# Patient Record
Sex: MALE | Race: WHITE | NOT HISPANIC OR LATINO | URBAN - METROPOLITAN AREA
[De-identification: names, ages, dates, MRNs, and addresses within clinical notes are randomized per-mention and may not be internally consistent; named-entity substitution may affect disease eponyms.]

---

## 2018-03-25 ENCOUNTER — INPATIENT (INPATIENT)
Facility: HOSPITAL | Age: 83
LOS: 1 days | Discharge: HOME | End: 2018-03-27
Attending: INTERNAL MEDICINE

## 2018-03-25 LAB
BASOPHILS # BLD AUTO: 0.05 K/UL — SIGNIFICANT CHANGE UP (ref 0–0.2)
BASOPHILS NFR BLD AUTO: 0.6 % — SIGNIFICANT CHANGE UP (ref 0–1)
EOSINOPHIL # BLD AUTO: 0.21 K/UL — SIGNIFICANT CHANGE UP (ref 0–0.7)
EOSINOPHIL NFR BLD AUTO: 2.3 % — SIGNIFICANT CHANGE UP (ref 0–8)
HCT VFR BLD CALC: 31.1 % — LOW (ref 42–52)
HGB BLD-MCNC: 11.1 G/DL — LOW (ref 14–18)
IMM GRANULOCYTES NFR BLD AUTO: 0.2 % — SIGNIFICANT CHANGE UP (ref 0.1–0.3)
LYMPHOCYTES # BLD AUTO: 2.1 K/UL — SIGNIFICANT CHANGE UP (ref 1.2–3.4)
LYMPHOCYTES # BLD AUTO: 23.3 % — SIGNIFICANT CHANGE UP (ref 20.5–51.1)
MCHC RBC-ENTMCNC: 32 PG — HIGH (ref 27–31)
MCHC RBC-ENTMCNC: 35.7 G/DL — SIGNIFICANT CHANGE UP (ref 32–37)
MCV RBC AUTO: 89.6 FL — SIGNIFICANT CHANGE UP (ref 80–94)
MONOCYTES # BLD AUTO: 0.63 K/UL — HIGH (ref 0.1–0.6)
MONOCYTES NFR BLD AUTO: 7 % — SIGNIFICANT CHANGE UP (ref 1.7–9.3)
NEUTROPHILS # BLD AUTO: 6 K/UL — SIGNIFICANT CHANGE UP (ref 1.4–6.5)
NEUTROPHILS NFR BLD AUTO: 66.6 % — SIGNIFICANT CHANGE UP (ref 42.2–75.2)
PLATELET # BLD AUTO: 257 K/UL — SIGNIFICANT CHANGE UP (ref 130–400)
RBC # BLD: 3.47 M/UL — LOW (ref 4.7–6.1)
RBC # FLD: 13 % — SIGNIFICANT CHANGE UP (ref 11.5–14.5)
WBC # BLD: 9.01 K/UL — SIGNIFICANT CHANGE UP (ref 4.8–10.8)
WBC # FLD AUTO: 9.01 K/UL — SIGNIFICANT CHANGE UP (ref 4.8–10.8)

## 2018-03-25 RX ORDER — LABETALOL HCL 100 MG
10 TABLET ORAL ONCE
Qty: 0 | Refills: 0 | Status: COMPLETED | OUTPATIENT
Start: 2018-03-25 | End: 2018-03-25

## 2018-03-25 RX ADMIN — Medication 10 MILLIGRAM(S): at 23:29

## 2018-03-26 VITALS
DIASTOLIC BLOOD PRESSURE: 72 MMHG | HEART RATE: 70 BPM | RESPIRATION RATE: 18 BRPM | TEMPERATURE: 99 F | SYSTOLIC BLOOD PRESSURE: 132 MMHG | OXYGEN SATURATION: 97 %

## 2018-03-26 DIAGNOSIS — I50.1 LEFT VENTRICULAR FAILURE, UNSPECIFIED: ICD-10-CM

## 2018-03-26 DIAGNOSIS — I10 ESSENTIAL (PRIMARY) HYPERTENSION: ICD-10-CM

## 2018-03-26 DIAGNOSIS — N18.9 CHRONIC KIDNEY DISEASE, UNSPECIFIED: ICD-10-CM
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
## 2018-03-26 DIAGNOSIS — D41.4 NEOPLASM OF UNCERTAIN BEHAVIOR OF BLADDER: Chronic | ICD-10-CM

## 2018-03-26 LAB
ALBUMIN SERPL ELPH-MCNC: 3.8 G/DL — SIGNIFICANT CHANGE UP (ref 3.5–5.2)
ALP SERPL-CCNC: 77 U/L — SIGNIFICANT CHANGE UP (ref 30–115)
ALT FLD-CCNC: 8 U/L — SIGNIFICANT CHANGE UP (ref 0–41)
ANION GAP SERPL CALC-SCNC: 10 MMOL/L — SIGNIFICANT CHANGE UP (ref 7–14)
ANION GAP SERPL CALC-SCNC: 14 MMOL/L — SIGNIFICANT CHANGE UP (ref 7–14)
APPEARANCE UR: CLEAR — SIGNIFICANT CHANGE UP
APTT BLD: 25.7 SEC — LOW (ref 27–39.2)
AST SERPL-CCNC: 15 U/L — SIGNIFICANT CHANGE UP (ref 0–41)
BASOPHILS # BLD AUTO: 0.04 K/UL — SIGNIFICANT CHANGE UP (ref 0–0.2)
BASOPHILS NFR BLD AUTO: 0.5 % — SIGNIFICANT CHANGE UP (ref 0–1)
BILIRUB SERPL-MCNC: 0.4 MG/DL — SIGNIFICANT CHANGE UP (ref 0.2–1.2)
BILIRUB UR-MCNC: NEGATIVE — SIGNIFICANT CHANGE UP
BUN SERPL-MCNC: 33 MG/DL — HIGH (ref 10–20)
BUN SERPL-MCNC: 34 MG/DL — HIGH (ref 10–20)
CALCIUM SERPL-MCNC: 9 MG/DL — SIGNIFICANT CHANGE UP (ref 8.5–10.1)
CALCIUM SERPL-MCNC: 9.6 MG/DL — SIGNIFICANT CHANGE UP (ref 8.5–10.1)
CHLORIDE SERPL-SCNC: 92 MMOL/L — LOW (ref 98–110)
CHLORIDE SERPL-SCNC: 96 MMOL/L — LOW (ref 98–110)
CK SERPL-CCNC: 145 U/L — SIGNIFICANT CHANGE UP (ref 0–225)
CK SERPL-CCNC: 163 U/L — SIGNIFICANT CHANGE UP (ref 0–225)
CO2 SERPL-SCNC: 21 MMOL/L — SIGNIFICANT CHANGE UP (ref 17–32)
CO2 SERPL-SCNC: 23 MMOL/L — SIGNIFICANT CHANGE UP (ref 17–32)
COLOR SPEC: YELLOW — SIGNIFICANT CHANGE UP
CREAT SERPL-MCNC: 2.2 MG/DL — HIGH (ref 0.7–1.5)
CREAT SERPL-MCNC: 2.3 MG/DL — HIGH (ref 0.7–1.5)
DIFF PNL FLD: (no result)
EOSINOPHIL # BLD AUTO: 0.15 K/UL — SIGNIFICANT CHANGE UP (ref 0–0.7)
EOSINOPHIL NFR BLD AUTO: 2 % — SIGNIFICANT CHANGE UP (ref 0–8)
GAS PNL BLDV: SIGNIFICANT CHANGE UP
GLUCOSE SERPL-MCNC: 116 MG/DL — HIGH (ref 70–99)
GLUCOSE SERPL-MCNC: 95 MG/DL — SIGNIFICANT CHANGE UP (ref 70–99)
GLUCOSE UR QL: NEGATIVE MG/DL — SIGNIFICANT CHANGE UP
HCT VFR BLD CALC: 25.6 % — LOW (ref 42–52)
HGB BLD-MCNC: 9.3 G/DL — LOW (ref 14–18)
IMM GRANULOCYTES NFR BLD AUTO: 0.5 % — HIGH (ref 0.1–0.3)
INR BLD: 1.27 RATIO — SIGNIFICANT CHANGE UP (ref 0.65–1.3)
KETONES UR-MCNC: NEGATIVE — SIGNIFICANT CHANGE UP
LEUKOCYTE ESTERASE UR-ACNC: NEGATIVE — SIGNIFICANT CHANGE UP
LYMPHOCYTES # BLD AUTO: 1.08 K/UL — LOW (ref 1.2–3.4)
LYMPHOCYTES # BLD AUTO: 14.7 % — LOW (ref 20.5–51.1)
MCHC RBC-ENTMCNC: 32.5 PG — HIGH (ref 27–31)
MCHC RBC-ENTMCNC: 36.3 G/DL — SIGNIFICANT CHANGE UP (ref 32–37)
MCV RBC AUTO: 89.5 FL — SIGNIFICANT CHANGE UP (ref 80–94)
MONOCYTES # BLD AUTO: 0.58 K/UL — SIGNIFICANT CHANGE UP (ref 0.1–0.6)
MONOCYTES NFR BLD AUTO: 7.9 % — SIGNIFICANT CHANGE UP (ref 1.7–9.3)
NEUTROPHILS # BLD AUTO: 5.45 K/UL — SIGNIFICANT CHANGE UP (ref 1.4–6.5)
NEUTROPHILS NFR BLD AUTO: 74.4 % — SIGNIFICANT CHANGE UP (ref 42.2–75.2)
NITRITE UR-MCNC: NEGATIVE — SIGNIFICANT CHANGE UP
NT-PROBNP SERPL-SCNC: HIGH PG/ML (ref 0–300)
PH UR: 6.5 — SIGNIFICANT CHANGE UP (ref 5–8)
PLATELET # BLD AUTO: 188 K/UL — SIGNIFICANT CHANGE UP (ref 130–400)
POTASSIUM SERPL-MCNC: 4.6 MMOL/L — SIGNIFICANT CHANGE UP (ref 3.5–5)
POTASSIUM SERPL-MCNC: 5.5 MMOL/L — HIGH (ref 3.5–5)
POTASSIUM SERPL-SCNC: 4.6 MMOL/L — SIGNIFICANT CHANGE UP (ref 3.5–5)
POTASSIUM SERPL-SCNC: 5.5 MMOL/L — HIGH (ref 3.5–5)
PROT SERPL-MCNC: 6 G/DL — SIGNIFICANT CHANGE UP (ref 6–8)
PROT UR-MCNC: 100 MG/DL
PROTHROM AB SERPL-ACNC: 13.8 SEC — HIGH (ref 9.95–12.87)
RBC # BLD: 2.86 M/UL — LOW (ref 4.7–6.1)
RBC # FLD: 13 % — SIGNIFICANT CHANGE UP (ref 11.5–14.5)
SODIUM SERPL-SCNC: 127 MMOL/L — LOW (ref 135–146)
SODIUM SERPL-SCNC: 129 MMOL/L — LOW (ref 135–146)
SP GR SPEC: 1.01 — SIGNIFICANT CHANGE UP (ref 1.01–1.03)
TROPONIN T SERPL-MCNC: 0.06 NG/ML — CRITICAL HIGH
TROPONIN T SERPL-MCNC: 0.11 NG/ML — CRITICAL HIGH
TROPONIN T SERPL-MCNC: 0.13 NG/ML — CRITICAL HIGH
TYPE + AB SCN PNL BLD: SIGNIFICANT CHANGE UP
UROBILINOGEN FLD QL: 0.2 MG/DL — SIGNIFICANT CHANGE UP (ref 0.2–0.2)
WBC # BLD: 7.34 K/UL — SIGNIFICANT CHANGE UP (ref 4.8–10.8)
WBC # FLD AUTO: 7.34 K/UL — SIGNIFICANT CHANGE UP (ref 4.8–10.8)

## 2018-03-26 RX ORDER — ASPIRIN/CALCIUM CARB/MAGNESIUM 324 MG
81 TABLET ORAL DAILY
Qty: 0 | Refills: 0 | Status: DISCONTINUED | OUTPATIENT
Start: 2018-03-26 | End: 2018-03-27

## 2018-03-26 RX ORDER — LISINOPRIL 2.5 MG/1
0 TABLET ORAL
Qty: 90 | Refills: 0 | COMMUNITY

## 2018-03-26 RX ORDER — FINASTERIDE 5 MG/1
5 TABLET, FILM COATED ORAL DAILY
Qty: 0 | Refills: 0 | Status: DISCONTINUED | OUTPATIENT
Start: 2018-03-26 | End: 2018-03-27

## 2018-03-26 RX ORDER — HEPARIN SODIUM 5000 [USP'U]/ML
5000 INJECTION INTRAVENOUS; SUBCUTANEOUS EVERY 12 HOURS
Qty: 0 | Refills: 0 | Status: DISCONTINUED | OUTPATIENT
Start: 2018-03-26 | End: 2018-03-27

## 2018-03-26 RX ORDER — LISINOPRIL 2.5 MG/1
20 TABLET ORAL DAILY
Qty: 0 | Refills: 0 | Status: DISCONTINUED | OUTPATIENT
Start: 2018-03-26 | End: 2018-03-26

## 2018-03-26 RX ORDER — FUROSEMIDE 40 MG
60 TABLET ORAL ONCE
Qty: 0 | Refills: 0 | Status: COMPLETED | OUTPATIENT
Start: 2018-03-26 | End: 2018-03-26

## 2018-03-26 RX ORDER — TETANUS TOXOID, REDUCED DIPHTHERIA TOXOID AND ACELLULAR PERTUSSIS VACCINE, ADSORBED 5; 2.5; 8; 8; 2.5 [IU]/.5ML; [IU]/.5ML; UG/.5ML; UG/.5ML; UG/.5ML
0.5 SUSPENSION INTRAMUSCULAR ONCE
Qty: 0 | Refills: 0 | Status: COMPLETED | OUTPATIENT
Start: 2018-03-26 | End: 2018-03-26

## 2018-03-26 RX ORDER — FUROSEMIDE 40 MG
20 TABLET ORAL DAILY
Qty: 0 | Refills: 0 | Status: DISCONTINUED | OUTPATIENT
Start: 2018-03-26 | End: 2018-03-27

## 2018-03-26 RX ORDER — FINASTERIDE 5 MG/1
0 TABLET, FILM COATED ORAL
Qty: 90 | Refills: 0 | COMMUNITY

## 2018-03-26 RX ORDER — SODIUM BICARBONATE 1 MEQ/ML
1 SYRINGE (ML) INTRAVENOUS
Qty: 0 | Refills: 0 | COMMUNITY

## 2018-03-26 RX ADMIN — Medication 60 MILLIGRAM(S): at 02:47

## 2018-03-26 RX ADMIN — Medication 1 TABLET(S): at 02:46

## 2018-03-26 RX ADMIN — FINASTERIDE 5 MILLIGRAM(S): 5 TABLET, FILM COATED ORAL at 14:27

## 2018-03-26 RX ADMIN — HEPARIN SODIUM 5000 UNIT(S): 5000 INJECTION INTRAVENOUS; SUBCUTANEOUS at 17:19

## 2018-03-26 RX ADMIN — TETANUS TOXOID, REDUCED DIPHTHERIA TOXOID AND ACELLULAR PERTUSSIS VACCINE, ADSORBED 0.5 MILLILITER(S): 5; 2.5; 8; 8; 2.5 SUSPENSION INTRAMUSCULAR at 02:46

## 2018-03-26 RX ADMIN — Medication 81 MILLIGRAM(S): at 14:27

## 2018-03-26 NOTE — ED PROVIDER NOTE - CARE PLAN
Principal Discharge DX:	Pulmonary edema with congestive heart failure  Secondary Diagnosis:	Troponin level elevated  Secondary Diagnosis:	Accidental fall, initial encounter

## 2018-03-26 NOTE — H&P ADULT - NSHPLABSRESULTS_GEN_ALL_CORE
< from: 12 Lead ECG (03.26.18 @ 00:36) >    Ventricular Rate 70 BPM    Atrial Rate 70 BPM    P-R Interval 310 ms    QRS Duration 150 ms    Q-T Interval 446 ms    QTC Calculation(Bezet) 481 ms    P Axis 63 degrees    R Axis -63 degrees    T Axis 64 degrees    < end of copied text >    1st deg AV block, RBBB, no ST seg or T wav changes

## 2018-03-26 NOTE — H&P ADULT - ATTENDING COMMENTS
Pt seen and examined independently.  Family at bedside who translated for me (Pt speaks [place]ian but understands English and he is very [place]). Pt denies pain at this time.  He is more comfortable per family.  Edema of LE has improved per daughters.  He is lying down (HOB 29%) and in no distress.  Per family, pt did not likely pass out - he fell while getting out of bed and may have slipped on his slippers. He has sutures to right eyebrow area and nose.  Lungs - decreased BS b/l but no crackles.  Heart - 3/6 systolic murmur  LE - mild Left LE edema, Right - no edema  Cardiology note read.  Pt on telemetry and HR in the 50's  Continue PO diuresis and fluid restriction for CHF (unknown type at this time) and hyponatremia.  Monitor BMP    Troponin T, Serum (03.26.18 @ 11:23)    Troponin T, Serum: 0.11: Critical value: ng/mL    Check another set of cardiac enzymes (pending) and 2D ECHO    Family will bring in walker.   Rehab consult    I reviewed the resident's note and I agree with the physical exam, assessment and plan with additions as above.

## 2018-03-26 NOTE — CONSULT NOTE ADULT - SUBJECTIVE AND OBJECTIVE BOX
Patient is a 89y old  Male who presents with a chief complaint of Fall (26 Mar 2018 08:29)      HPI:  This is the case of a 89 YO man presenting to the ED by ambulance for the above CC.  The patient fell face forward while he was trying to get up from bed. There is no clear history of loss of consciousness before or after the fall. Following the fall the patient sustained wounds on his nose and his right inferior eyelid with bruises on his hands. He denies any pain in his upper or lower extremities or on his trunk.   The patient denies any history of chest pain, palpitations or exertional dyspnea. He does complains of edema of the feet and ankles that has been present for many months. He also has a history of recurrent dizziness that occasionally occurs with changes in position.  There is no recent history of fevers or chills. No weight or appetite changes. (26 Mar 2018 08:29)      PAST MEDICAL & SURGICAL HISTORY:  Chronic kidney disease, unspecified CKD stage  Bladder cancer  HTN (hypertension)  Bladder polyps      PREVIOUS DIAGNOSTIC TESTING:      ECHO  FINDINGS:    STRESS  FINDINGS:    CATHETERIZATION  FINDINGS:    MEDICATIONS  (STANDING):  aspirin  chewable 81 milliGRAM(s) Oral daily  finasteride 5 milliGRAM(s) Oral daily  furosemide    Tablet 20 milliGRAM(s) Oral daily  heparin  Injectable 5000 Unit(s) SubCutaneous every 12 hours    MEDICATIONS  (PRN):      FAMILY HISTORY:  No pertinent family history in first degree relatives      SOCIAL HISTORY:  CIGARETTES:    ALCOHOL:    REVIEW OF SYSTEMS:  CONSTITUTIONAL: No fever, weight loss, or fatigue  NECK: No pain or stiffness  RESPIRATORY: No cough, wheezing, chills or hemoptysis; No shortness of breath  CARDIOVASCULAR: No chest pain, palpitations, dizziness, or leg swelling  GASTROINTESTINAL: No abdominal or epigastric pain. No nausea, vomiting, or hematemesis; No diarrhea or constipation. No melena or hematochezia.  GENITOURINARY: No dysuria, frequency, hematuria, or incontinence  NEUROLOGICAL: No headaches, memory loss, loss of strength, numbness, or tremors  SKIN: No itching, burning, rashes, or lesions   ENDOCRINE: No heat or cold intolerance; No hair loss  MUSCULOSKELETAL: No joint pain or swelling; No muscle, back, or extremity pain  HEME/LYMPH: No easy bruising, or bleeding gums          Vital Signs Last 24 Hrs  T(C): 37.1 (26 Mar 2018 07:44), Max: 37.1 (26 Mar 2018 07:00)  T(F): 98.8 (26 Mar 2018 07:44), Max: 98.8 (26 Mar 2018 07:00)  HR: 57 (26 Mar 2018 07:44) (57 - 70)  BP: 130/62 (26 Mar 2018 07:44) (130/62 - 132/72)  BP(mean): --  RR: 18 (26 Mar 2018 07:44) (18 - 18)  SpO2: 99% (26 Mar 2018 07:44) (97% - 99%)        PHYSICAL EXAM:  GENERAL: NAD, well-groomed, well-developed  HEAD:  Atraumatic, Normocephalic  NECK: Supple, No JVD, Normal thyroid  NERVOUS SYSTEM:  Alert & Oriented X3, Good concentration  CHEST/LUNG: Clear to percussion bilaterally; No rales, rhonchi, wheezing, or rubs  HEART: Regular rate and rhythm; No murmurs, rubs, or gallops  ABDOMEN: Soft, Nontender, Nondistended; Bowel sounds present  EXTREMITIES:  2+ Peripheral Pulses, No clubbing, cyanosis, or edema  SKIN: No rashes or lesions    INTERPRETATION OF TELEMETRY:    ECG:    I&O's Detail    25 Mar 2018 07:01  -  26 Mar 2018 07:00  --------------------------------------------------------  IN:  Total IN: 0 mL    OUT:    Voided: 600 mL  Total OUT: 600 mL    Total NET: -600 mL          LABS:                        11.1   9.01  )-----------( 257      ( 25 Mar 2018 23:17 )             31.1     03-25    127<L>  |  92<L>  |  34<H>  ----------------------------<  116<H>  5.5<H>   |  21  |  2.2<H>    Ca    9.6      25 Mar 2018 23:17    TPro  6.0  /  Alb  3.8  /  TBili  0.4  /  DBili  x   /  AST  15  /  ALT  8   /  AlkPhos  77  03-25    CARDIAC MARKERS ( 25 Mar 2018 23:17 )  x     / 0.06 ng/mL / x     / x     / x          PT/INR - ( 25 Mar 2018 23:17 )   PT: 13.80 sec;   INR: 1.27 ratio         PTT - ( 25 Mar 2018 23:17 )  PTT:25.7 sec  Urinalysis Basic - ( 25 Mar 2018 23:17 )    Color: Yellow / Appearance: Clear / S.015 / pH: x  Gluc: x / Ketone: Negative  / Bili: Negative / Urobili: 0.2 mg/dL   Blood: x / Protein: 100 mg/dL / Nitrite: Negative   Leuk Esterase: Negative / RBC: 1-2 /HPF / WBC x   Sq Epi: x / Non Sq Epi: x / Bacteria: Few /HPF      I&O's Summary    25 Mar 2018 07:01  -  26 Mar 2018 07:00  --------------------------------------------------------  IN: 0 mL / OUT: 600 mL / NET: -600 mL        RADIOLOGY & ADDITIONAL STUDIES:

## 2018-03-26 NOTE — H&P ADULT - NSHPPHYSICALEXAM_GEN_ALL_CORE
Vital Signs Last 24 Hrs  T(C): 37.1 (26 Mar 2018 07:44), Max: 37.1 (26 Mar 2018 07:00)  T(F): 98.8 (26 Mar 2018 07:44), Max: 98.8 (26 Mar 2018 07:00)  HR: 57 (26 Mar 2018 07:44) (57 - 70)  BP: 130/62 (26 Mar 2018 07:44) (130/62 - 132/72)  BP(mean): --  RR: 18 (26 Mar 2018 07:44) (18 - 18)  SpO2: 99% (26 Mar 2018 07:44) (97% - 99%)    GENERAL APPEARANCE:  alert and cooperative, and appears to be in no acute distress.  HEENT: ecchymosis of right eye with sutured wound on lower eyelid and nasal ridge.  NECK: Neck supple, non-tender without lymphadenopathy, masses or thyromegaly.  CARDIAC: RRR, Normal S1 and S2. 4/6 aortic systolic murmur radiating to the carotids with 3/6 mitral systolic murmur  LUNGS: Clear to auscultation without rales, rhonchi or wheezing.  ABDOMEN: Positive bowel sounds. Soft, nondistended, nontender. No guarding or rebound. No HSM.  EXTREMITIES: Mild 1+ bilateral LLE involving the feet and ankles. Peripheral pulses intact. No varicosities.

## 2018-03-26 NOTE — ED PROVIDER NOTE - PHYSICAL EXAMINATION
CONSTITUTIONAL: Elderly male, with blood on face, unable to follow commands, awake  SKIN: warm, dry  HEAD: + 2cm laceration inferior to right eye lid, with mild oozing of blood; 1cm laceration to bridge of nose with mild oozing of blood noted   EYES: no gross trauma bilaterally, no proptosis  ENT: No nasal discharge; airway clear. no septal hematoma or hemotypanum; no racoon eyes no bailey sign  NECK: no midline tenderness, normal ROM  CHEST: no crepitus or bruising  CARD: S1, S2 normal; no murmurs, gallops, or rubs. Regular rate and rhythm.   RESP: No wheezes, rales or rhonchi.  ABD: soft ntnd  BACK: no midline tenderness or step offs  PELVIS: no laxity with lateral compression  EXT: no gross extremity injury  NEURO: moving all extremities grossly, does not follow commands, eyes spontaneously open

## 2018-03-26 NOTE — H&P ADULT - HISTORY OF PRESENT ILLNESS
This is the case of a 89 YO man presenting to the ED by ambulance for the above CC.  The patient fell face forward while he was trying to get up from bed. There is no clear history of loss of consciousness before or after the fall. Following the fall the patient sustained wounds on his nose and his right inferior eyelid with bruises on his hands. He denies any pain in his upper or lower extremities or on his trunk.   The patient denies any history of chest pain, palpitations or exertional dyspnea. He does complains of edema of the feet and ankles that has been present for many months. He also has a history of recurrent dizziness that occasionally occurs with changes in position.  There is no recent history of fevers or chills. No weight or appetite changes.

## 2018-03-26 NOTE — H&P ADULT - ASSESSMENT
89 YO man with a Hx of CKD and HTN presenting for a fall.    Code status: DNR/DNI (family will provide a living will)

## 2018-03-26 NOTE — CONSULT NOTE ADULT - SUBJECTIVE AND OBJECTIVE BOX
HPI: Came in s/p fall. Found down on floors.                  11.1   9.01  )-----------( 257      ( 03-25 @ 23:17 )             31.1                    127   |  92    |  34                 Ca: 9.6    BMP:   ----------------------------< 116    Mg: x     (03-25-18 @ 23:17)             5.5    |  21    | 2.2                Ph: x        LFT:     TPro: 6.0 / Alb: 3.8 / TBili: 0.4 / DBili: x / AST: 15 / ALT: 8 / AlkPhos: 77   (03-25-18 @ 23:17)          PT/INR - ( 25 Mar 2018 23:17 )   PT: 13.80 sec;   INR: 1.27 ratio         PTT - ( 25 Mar 2018 23:17 )  PTT:25.7 sec    CARDIAC MARKERS ( 25 Mar 2018 23:17 )  x     / 0.06 ng/mL / x     / x     / x        PHYSICAL EXAM:  General Appearance: Appears well, NAD  Neck: Supple  Chest: Equal expansion bilaterally, equal breath sounds  CV: S1, S2, RRR  Abdomen: Soft, NT, ND  Extremities: Grossly symmetric, WNL HPI: 89 y/o male with pmhx of HTN, bladder ca presents with fall. Per family, patient was getting out of bed putting on his slippers when he fell face forward on to the floor. Per family, patient is not on blood thinners, unknown LOC. Upon arrival, patient was not oriented to person or place, unable to answer questions.               11.1   9.01  )-----------( 257      ( 03-25 @ 23:17 )             31.1                    127   |  92    |  34                 Ca: 9.6    BMP:   ----------------------------< 116    Mg: x     (03-25-18 @ 23:17)             5.5    |  21    | 2.2                Ph: x        LFT:     TPro: 6.0 / Alb: 3.8 / TBili: 0.4 / DBili: x / AST: 15 / ALT: 8 / AlkPhos: 77   (03-25-18 @ 23:17)          PT/INR - ( 25 Mar 2018 23:17 )   PT: 13.80 sec;   INR: 1.27 ratio         PTT - ( 25 Mar 2018 23:17 )  PTT:25.7 sec    CARDIAC MARKERS ( 25 Mar 2018 23:17 )  x     / 0.06 ng/mL / x     / x     / x        PHYSICAL EXAM:  General Appearance: Appears well, NAD  Neck: Supple  Chest: Equal expansion bilaterally, equal breath sounds  CV: S1, S2, RRR  Abdomen: Soft, NT, ND  Extremities: Grossly symmetric, WNL

## 2018-03-26 NOTE — ED PROCEDURE NOTE - CPROC ED POST PROC CARE GUIDE1
Verbal/written post procedure instructions were given to patient/caregiver./Instructed patient/caregiver regarding signs and symptoms of infection./Instructed patient/caregiver to follow-up with primary care physician.
Instructed patient/caregiver regarding signs and symptoms of infection./Verbal/written post procedure instructions were given to patient/caregiver./Instructed patient/caregiver to follow-up with primary care physician.

## 2018-03-26 NOTE — ED PROVIDER NOTE - OBJECTIVE STATEMENT
89 y/o male with pmhx of HTN, bladder ca presents with fall. Per family, patient was getting out of bed putting on his slippers when he fell face forward on to the floor. Per family, patient is not on blood thinners, unknown LOC. Upon arrival, patient was not oriented to person or place, unable to answer questions.

## 2018-03-26 NOTE — ED PROVIDER NOTE - ATTENDING CONTRIBUTION TO CARE
89 y/o M PMH HTN BPH who presents s/p fall after getting up to go to the bathroom.  Patient cut face and was agitated and combative with EMS.  Patient asked what his name was in [place] and he could not answer upon arrival, and Trauma code called given head trauma and AMS (Patient also had very high BP)    Airway: Intact, Protecting airway  Breathing: Present b/l  Circulation: Strong pulses present in all four extremities    VITAL SIGNS: I have reviewed nursing notes and confirm.  CONSTITUTIONAL: elderly male, confused  SKIN: laceration under right eyelid and to bridge of nose  HEAD/Face: no tenderness to mandible, skull bones, or zygoma  EYES: EOMI, No orbital rim tenderness or proptosis  ENT: + tenderness to nasal bone with mild swelling, TM's normal b/l with no hemotympanum, no sinus tenderness to percussion, no dental or tongue injury, normal pharynx. No signs of Basilar skull fracture.   NECK: Supple; no midline cervical tenderness  Spine: normal appearing spine with no step-off  CARD: RRR, no murmurs, rubs or gallops  RESP: clear to ausculation b/l.  No rales, rhonchi, or wheezing.  Chest: goods chest rise with breathing, no rib tenderness or crepitus  ABD: soft, + BS, non-tender, non-distended, no rebound or guarding. No CVA tenderness  Pelvis: Pelvis stable, no tenderness to pelvic bones, sacrum  EXT: Full ROM, no bony tenderness, no pedal edema  NEURO: normal motor. normal sensory.   PSYCH: Alert, cooperative, and appropriate.    Patient had trauma work up.  Patient calmed down and family reports he is back to baseline. Patient has pitting edema to b/l legs.  CT scan read as pulmonary edema with possibly pulmonary HTN.  Patient has had increased leg swelling and WORK[employer].  He never had any heart issues.  Initial Troponin high at 0.05. Will give Lasix.  T-dap given and will give Augmentin for possible open nasal fracture. Patient to be admitted to [facility] for CHF and elevated Troponin.

## 2018-03-27 ENCOUNTER — TRANSCRIPTION ENCOUNTER (OUTPATIENT)
Age: 83
End: 2018-03-27

## 2018-03-27 VITALS
TEMPERATURE: 98 F | RESPIRATION RATE: 59 BRPM | OXYGEN SATURATION: 99 % | DIASTOLIC BLOOD PRESSURE: 81 MMHG | SYSTOLIC BLOOD PRESSURE: 189 MMHG | HEART RATE: 59 BPM

## 2018-03-27 DIAGNOSIS — C67.9 MALIGNANT NEOPLASM OF BLADDER, UNSPECIFIED: ICD-10-CM

## 2018-03-27 LAB
ANION GAP SERPL CALC-SCNC: 12 MMOL/L — SIGNIFICANT CHANGE UP (ref 7–14)
BASOPHILS # BLD AUTO: 0.07 K/UL — SIGNIFICANT CHANGE UP (ref 0–0.2)
BASOPHILS NFR BLD AUTO: 1 % — SIGNIFICANT CHANGE UP (ref 0–1)
BUN SERPL-MCNC: 34 MG/DL — HIGH (ref 10–20)
CALCIUM SERPL-MCNC: 9.3 MG/DL — SIGNIFICANT CHANGE UP (ref 8.5–10.1)
CHLORIDE SERPL-SCNC: 96 MMOL/L — LOW (ref 98–110)
CK MB BLD-MCNC: 2 % — SIGNIFICANT CHANGE UP (ref 0–4)
CK MB CFR SERPL CALC: 3.4 NG/ML — SIGNIFICANT CHANGE UP (ref 0.6–6.3)
CK SERPL-CCNC: 226 U/L — HIGH (ref 0–225)
CO2 SERPL-SCNC: 24 MMOL/L — SIGNIFICANT CHANGE UP (ref 17–32)
CREAT SERPL-MCNC: 2.4 MG/DL — HIGH (ref 0.7–1.5)
EOSINOPHIL # BLD AUTO: 0.2 K/UL — SIGNIFICANT CHANGE UP (ref 0–0.7)
EOSINOPHIL NFR BLD AUTO: 2.7 % — SIGNIFICANT CHANGE UP (ref 0–8)
GLUCOSE SERPL-MCNC: 113 MG/DL — HIGH (ref 70–99)
HCT VFR BLD CALC: 29 % — LOW (ref 42–52)
HGB BLD-MCNC: 10 G/DL — LOW (ref 14–18)
IMM GRANULOCYTES NFR BLD AUTO: 0.3 % — SIGNIFICANT CHANGE UP (ref 0.1–0.3)
LYMPHOCYTES # BLD AUTO: 1.1 K/UL — LOW (ref 1.2–3.4)
LYMPHOCYTES # BLD AUTO: 15 % — LOW (ref 20.5–51.1)
MAGNESIUM SERPL-MCNC: 2.2 MG/DL — SIGNIFICANT CHANGE UP (ref 1.8–2.4)
MCHC RBC-ENTMCNC: 31.5 PG — HIGH (ref 27–31)
MCHC RBC-ENTMCNC: 34.5 G/DL — SIGNIFICANT CHANGE UP (ref 32–37)
MCV RBC AUTO: 91.5 FL — SIGNIFICANT CHANGE UP (ref 80–94)
MONOCYTES # BLD AUTO: 0.59 K/UL — SIGNIFICANT CHANGE UP (ref 0.1–0.6)
MONOCYTES NFR BLD AUTO: 8 % — SIGNIFICANT CHANGE UP (ref 1.7–9.3)
NEUTROPHILS # BLD AUTO: 5.37 K/UL — SIGNIFICANT CHANGE UP (ref 1.4–6.5)
NEUTROPHILS NFR BLD AUTO: 73 % — SIGNIFICANT CHANGE UP (ref 42.2–75.2)
PHOSPHATE SERPL-MCNC: 3.9 MG/DL — SIGNIFICANT CHANGE UP (ref 2.1–4.9)
PLATELET # BLD AUTO: 202 K/UL — SIGNIFICANT CHANGE UP (ref 130–400)
POTASSIUM SERPL-MCNC: 4.5 MMOL/L — SIGNIFICANT CHANGE UP (ref 3.5–5)
POTASSIUM SERPL-SCNC: 4.5 MMOL/L — SIGNIFICANT CHANGE UP (ref 3.5–5)
RBC # BLD: 3.17 M/UL — LOW (ref 4.7–6.1)
RBC # FLD: 13.2 % — SIGNIFICANT CHANGE UP (ref 11.5–14.5)
SODIUM SERPL-SCNC: 132 MMOL/L — LOW (ref 135–146)
TROPONIN T SERPL-MCNC: 0.11 NG/ML — CRITICAL HIGH
WBC # BLD: 7.35 K/UL — SIGNIFICANT CHANGE UP (ref 4.8–10.8)
WBC # FLD AUTO: 7.35 K/UL — SIGNIFICANT CHANGE UP (ref 4.8–10.8)

## 2018-03-27 RX ORDER — ASPIRIN/CALCIUM CARB/MAGNESIUM 324 MG
1 TABLET ORAL
Qty: 90 | Refills: 0 | OUTPATIENT
Start: 2018-03-27 | End: 2018-06-24

## 2018-03-27 RX ORDER — METOPROLOL TARTRATE 50 MG
0.5 TABLET ORAL
Qty: 45 | Refills: 0 | OUTPATIENT
Start: 2018-03-27 | End: 2018-06-24

## 2018-03-27 RX ORDER — ASPIRIN/CALCIUM CARB/MAGNESIUM 324 MG
1 TABLET ORAL
Qty: 0 | Refills: 0 | COMMUNITY

## 2018-03-27 RX ORDER — ATORVASTATIN CALCIUM 80 MG/1
1 TABLET, FILM COATED ORAL
Qty: 90 | Refills: 0 | OUTPATIENT
Start: 2018-03-27 | End: 2018-06-24

## 2018-03-27 RX ADMIN — FINASTERIDE 5 MILLIGRAM(S): 5 TABLET, FILM COATED ORAL at 12:19

## 2018-03-27 RX ADMIN — Medication 81 MILLIGRAM(S): at 12:19

## 2018-03-27 RX ADMIN — Medication 20 MILLIGRAM(S): at 05:02

## 2018-03-27 RX ADMIN — HEPARIN SODIUM 5000 UNIT(S): 5000 INJECTION INTRAVENOUS; SUBCUTANEOUS at 05:02

## 2018-03-27 NOTE — CONSULT NOTE ADULT - SUBJECTIVE AND OBJECTIVE BOX
HPI:  This is the case of a 89 YO right-handed  white man presenting to the ED by ambulance for the above CC.  The patient fell face forward while he was trying to get up from bed. There is no clear history of loss of consciousness before or after the fall. Following the fall the patient sustained wounds on his nose and his right inferior eyelid with bruises on his hands. He denies any pain in his upper or lower extremities or on his trunk.   The patient denies any history of chest pain, palpitations or exertional dyspnea. He does complains of edema of the feet and ankles that has been present for many months. He also has a history of recurrent dizziness that occasionally occurs with changes in position.  There is no recent history of fevers or chills. No weight or appetite changes. (26 Mar 2018 08:29)      PAST MEDICAL & SURGICAL HISTORY:  Chronic kidney disease, unspecified CKD stage  Bladder cancer  HTN (hypertension)  Bladder polyps  Vertigo    Hospital Course:  Sutures placed to bridge of nose and eyebrow. On furosemide.    TODAY'S SUBJECTIVE & REVIEW OF SYMPTOMS:     Constitutional WNL   Cardio WNL   Resp WNL   GI WNL  Heme WNL  Endo WNL  Skin WNL  MSK WNL  Neuro No dizziness in bed  Cognitive WNL  Psych WNL      MEDICATIONS  (STANDING):  aspirin  chewable 81 milliGRAM(s) Oral daily  finasteride 5 milliGRAM(s) Oral daily  furosemide    Tablet 20 milliGRAM(s) Oral daily  heparin  Injectable 5000 Unit(s) SubCutaneous every 12 hours    MEDICATIONS  (PRN):      FAMILY HISTORY:  Father  with bladder caner      Allergies    No Known Allergies    Intolerances        SOCIAL HISTORY:    [  ] EtOH  [  ] Smoking  [  ] Substance abuse     Home Environment:  [  ] Home Alone  [ X ] Lives with Family--wife  [  ] Home Health Aid    Dwelling:  [  ] Apartment  [ X ] Private House  [  ] Adult Home  [  ] Skilled Nursing Facility      [  ] Short Term  [  ] Long Term  [ X ] Stairs 5 step entry, 13 steps inside, but patient now stays on the ground level      Elevator [  ]    FUNCTIONAL STATUS PTA: (Check all that apply)  Ambulation: [ X  ]Independent    [  ] Dependent     [  ] Non-Ambulatory  Assistive Device: [ X ] SA Cane outdoors   [  ]  Q Cane  [  ] Walker  [  ]  Wheelchair  ADL : [ X ] Independent  [  ]  Dependent       Vital Signs Last 24 Hrs  T(C): 36.8 (26 Mar 2018 23:32), Max: 36.8 (26 Mar 2018 23:32)  T(F): 98.2 (26 Mar 2018 23:32), Max: 98.2 (26 Mar 2018 23:32)  HR: 58 (26 Mar 2018 23:32) (53 - 58)  BP: 166/74 (26 Mar 2018 23:32) (142/73 - 166/74)  BP(mean): --  RR: 18 (26 Mar 2018 23:32) (18 - 18)  SpO2: 98% (26 Mar 2018 23:32) (96% - 98%)      PHYSICAL EXAM: Alert & Oriented X 3, [place] speaking  GENERAL: NAD, well-groomed, well-developed  HEAD:  + trauma to bilateral orbits with ecchymoses, sutures to bridge of nose and brow  EYES: EOMI, PERRLA, conjunctiva and sclera clear  NECK: Supple, No JVD, Normal thyroid  CHEST/LUNG: Clear to percussion bilaterally; No rales, rhonchi, wheezing, or rubs  HEART: Regular rate and rhythm; No murmurs, rubs, or gallops  ABDOMEN: Soft, Nontender, Nondistended; Bowel sounds present  EXTREMITIES:  2+ Peripheral Pulses, No clubbing, cyanosis, or edema    NERVOUS SYSTEM:  Cranial Nerves 2-12 intact [ X ] Abnormal  [  ]  ROM: WFL all extremities [ X ]  Abnormal [  ]  Motor Strength: WFL all extremities  [  ]  Abnormal [X  ]  Sensation: intact to light touch [ X ] Abnormal [  ]  Reflexes: Symmetric [  ]  Abnormal [  ]    FUNCTIONAL STATUS:  Bed Mobility: Independent [  ]  Supervision [ X ]  Needs Assistance [  ]  N/A [  ]  Transfers: Independent [  ]  Supervision [  ]  Needs Assistance [ X ]  N/A [  ]   Ambulation: Independent [  ]  Supervision [  ]  Needs Assistance [ X ]  N/A [  ]  ADL: Independent [ X ] Requires Assistance [  ] N/A [  ]      LABS:                        9.3    7.34  )-----------( 188      ( 26 Mar 2018 11:23 )             25.6     03-    129<L>  |  96<L>  |  33<H>  ----------------------------<  95  4.6   |  23  |  2.3<H>    Ca    9.0      26 Mar 2018 11:23    TPro  6.0  /  Alb  3.8  /  TBili  0.4  /  DBili  x   /  AST  15  /  ALT  8   /  AlkPhos  77      PT/INR - ( 25 Mar 2018 23:17 )   PT: 13.80 sec;   INR: 1.27 ratio         PTT - ( 25 Mar 2018 23:17 )  PTT:25.7 sec  Urinalysis Basic - ( 25 Mar 2018 23:17 )    Color: Yellow / Appearance: Clear / S.015 / pH: x  Gluc: x / Ketone: Negative  / Bili: Negative / Urobili: 0.2 mg/dL   Blood: x / Protein: 100 mg/dL / Nitrite: Negative   Leuk Esterase: Negative / RBC: 1-2 /HPF / WBC x   Sq Epi: x / Non Sq Epi: x / Bacteria: Few /HPF        RADIOLOGY & ADDITIONAL STUDIES:    EXAM:  XR CHEST FRONTAL 1V            PROCEDURE DATE:  2018            INTERPRETATION:  Clinical History/Reason for Exam:  trauma level 1  Comparison : Chest x-ray-  2007      Findings:    Technique/Positioning:  Rotated to the left    Support devices:  Monitoring devices overlie lung fields, obscuring   underlying anatomy.       Cardiac/mediastinum/hilum: Unremarkable    Lung parenchyma/ Pleura: No focal parenchymal opacities, effusion or   pneumothorax is present.     Likely skinfold overlies right costophrenic angle.      Skeleton/soft tissues: Stable degenerative changes.      Impression:    Rotated to the left.    No consolidation effusion or pneumothorax.        EXAM:  XR PELVIS AP ONLY 1-2 VIEWS            PROCEDURE DATE:  2018            INTERPRETATION:  Clinical history:Trauma      Technique: XR PELVIS AP ONLY 1-2 VIEWS  Comparison: None available    Findings/impression.    Severe degenerative changes of the lower lumbar spine. The pelvic rim is   difficult to evaluate secondary to motion. Please see subsequent CT.          PROCEDURE DATE:  2018            INTERPRETATION:  Clinical History/Reason For Exam: Level 1 trauma    Technique: Multiple contiguous axial CT images were obtained from the   base of the skull to the vertex without administration of intravenous   contrast. Axial, coronal and sagittal images were reviewed.    No studies are available for direct comparison.    Findings:     The ventricles, basal cisterns and sulcal pattern are prominent and   compatible with parenchymal volume loss    The gray-white matter differentiation is preserved. However, chronic   appearing infarct is noted in the left cerebellum.    Patchy hypodensities are seen in the periventricular and subcortical   white matter, nonspecific and without mass-effect, and may represent   areas of microvascular change.    There is no acute mass effect, midline shift or intracranial hemorrhage.      The imaged paranasal sinuses and bilateral mastoid complexes are   unremarkable.    No evidence of a depressed skull fracture.    Beam hardening artifact is noted overlying the brain stem and posterior   fossa which is inherent to CT in this location.      Impression:       No evidence of intracranial hemorrhage, territorial infarct, or mass   effect.    Extensive microvascular ischemic changes.    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        EXAM:  CT CERVICAL SPINE            PROCEDURE DATE:  2018            INTERPRETATION:  Clinical History / Reason for exam: Level 1 trauma    TECHNIQUE:  Contiguous unenhanced CT axial images of the cervical spine   with coronal and sagittal re-formations.    COMPARISON: None available    FINDINGS:    The images are degraded by motion without evidence of a gross fracture or   subluxation.    There is ossification along the anterior longitudinal ligament compatible   with diffuse edema pelvic skeletal hyperostosis.    Multilevel degenerative changes are noted and suboptimally imaged.      IMPRESSION:    Motion degraded nondiagnostic exam      EXAM:  CT ABDOMEN AND PELVIS IC        EXAM:  CT CHEST IC            PROCEDURE DATE:  2018            INTERPRETATION:  CLINICAL HISTORY / REASON FOR EXAM: Trauma.    TECHNIQUE: Multislice helical sections were obtained from the thoracic   inlet through the pelvis with contrast administration and without oral   contrast. Coronal and sagittal images have been submitted for evaluation.    COMPARISON: None.      FINDINGS:     Severely motion degraded exam.    CHEST:    LUNGS, PLEURA, AIRWAYS:Motion limited examination demonstrates apparent   prominence of central pulmonary vasculature structures. No pleural   effusion or pneumothorax is present. There is no evidence of   endobronchial obstruction, bronchiectasis or honeycombing.    THORACIC NODES: No mediastinal or axillary lymphadenopathy      MEDIASTINUM/GREAT VESSELS: No pericardial effusion. The visualized   thyroid gland is unremarkable. Heart size is enlarged. Aorta is normal   caliber. Main pulmonary artery measures 3.8 cm, which may be seen in   setting of pulmonary arterial hypertension.  Small volume of air within the right jugular vein, which may be seen   following catheterization. Aortic valvular and chronic calcifications.    TUBES/LINES: None.    ABDOMEN/ PELVIS:      HEPATOBILIARY: The liver is normal in appearance.  No evidence of intra   or extrahepatic biliary dilatation. The gallbladder is suboptimally   imaged.    SPLEEN: Unremarkable. Nonspecific splenic calcification.    PANCREAS: Unremarkable.    ADRENAL GLANDS: Unremarkable.    KIDNEYS: Bilateral renal cysts. Symmetric pattern of renal enhancement.   No evidence of a mass, hydronephrosis or hydroureter.    ABDOMINOPELVIC NODES: No enlarged abdominal or pelvic lymph nodes.    PELVIC ORGANS: Prostatomegaly, with largest transverse diameter measuring   5.6 cm.    PERITONEUM/MESENTERY/BOWEL: Small hiatal hernia. No bowel obstruction,   ascites or free intraperitoneal air.    BONES/SOFT TISSUES: There are degenerative changes of the spine. No acute  osseous abnormality.    VASCULAR:  The aorta is normal in caliber.      IMPRESSION:      Motion limited examination demonstrating no CT evidence of acute   intrathoracic or intra-abdominal pathology.    Main pulmonary artery dilated to 3.8 cm, which may be seen in setting of   pulmonary arterial hypertension.    Cardiomegaly, and apparent pulmonary edema, likely reflecting a component   of CHF. HPI:  This is the case of a 89 YO right-handed  white man presenting to the ED by ambulance for the above CC.  The patient fell face forward while he was trying to get up from bed. There is no clear history of loss of consciousness before or after the fall. Following the fall the patient sustained wounds on his nose and his right inferior eyelid with bruises on his hands. He denies any pain in his upper or lower extremities or on his trunk.   The patient denies any history of chest pain, palpitations or exertional dyspnea. He does complains of edema of the feet and ankles that has been present for many months. He also has a history of recurrent dizziness that occasionally occurs with changes in position.  There is no recent history of fevers or chills. No weight or appetite changes. (26 Mar 2018 08:29)      PAST MEDICAL & SURGICAL HISTORY:  Chronic kidney disease, unspecified CKD stage  Bladder cancer  HTN (hypertension)  Bladder polyps  Vertigo  +[place] with hearing aid    Hospital Course:  Sutures placed to bridge of nose and eyebrow. On furosemide.    TODAY'S SUBJECTIVE & REVIEW OF SYMPTOMS:     Constitutional WNL   Cardio WNL   Resp WNL   GI WNL  Heme WNL  Endo WNL  Skin WNL  MSK WNL  Neuro No dizziness in bed  Cognitive WNL  Psych WNL      MEDICATIONS  (STANDING):  aspirin  chewable 81 milliGRAM(s) Oral daily  finasteride 5 milliGRAM(s) Oral daily  furosemide    Tablet 20 milliGRAM(s) Oral daily  heparin  Injectable 5000 Unit(s) SubCutaneous every 12 hours    MEDICATIONS  (PRN):      FAMILY HISTORY:  Father  with bladder caner      Allergies    No Known Allergies    Intolerances        SOCIAL HISTORY:    [  ] EtOH  [  ] Smoking  [  ] Substance abuse     Home Environment:  [  ] Home Alone  [ X ] Lives with Family--wife  [  ] Home Health Aid    Dwelling:  [  ] Apartment  [ X ] Private House  [  ] Adult Home  [  ] Skilled Nursing Facility      [  ] Short Term  [  ] Long Term  [ X ] Stairs 5 step entry, 13 steps inside, but patient now stays on the ground level      Elevator [  ]    FUNCTIONAL STATUS PTA: (Check all that apply)  Ambulation: [ X  ]Independent    [  ] Dependent     [  ] Non-Ambulatory  Assistive Device: [ X ] SA Cane outdoors   [  ]  Q Cane  [  ] Walker  [  ]  Wheelchair  ADL : [ X ] Independent  [  ]  Dependent       Vital Signs Last 24 Hrs  T(C): 36.8 (26 Mar 2018 23:32), Max: 36.8 (26 Mar 2018 23:32)  T(F): 98.2 (26 Mar 2018 23:32), Max: 98.2 (26 Mar 2018 23:32)  HR: 58 (26 Mar 2018 23:32) (53 - 58)  BP: 166/74 (26 Mar 2018 23:32) (142/73 - 166/74)  BP(mean): --  RR: 18 (26 Mar 2018 23:32) (18 - 18)  SpO2: 98% (26 Mar 2018 23:32) (96% - 98%)      PHYSICAL EXAM: Alert & Oriented X 3, [place] speaking  GENERAL: NAD, well-groomed, well-developed  HEAD:  + trauma to bilateral orbits with ecchymoses, sutures to bridge of nose and brow  EYES: EOMI, PERRLA, conjunctiva and sclera clear  NECK: Supple, No JVD, Normal thyroid  CHEST/LUNG: Clear to percussion bilaterally; No rales, rhonchi, wheezing, or rubs  HEART: Regular rate and rhythm; No murmurs, rubs, or gallops  ABDOMEN: Soft, Nontender, Nondistended; Bowel sounds present  EXTREMITIES:  2+ Peripheral Pulses, No clubbing, cyanosis, or edema    NERVOUS SYSTEM:  Cranial Nerves 2-12 intact [  ] Abnormal  [ X ]  +[place]  ROM: WFL all extremities [ X ]  Abnormal [  ]  Motor Strength: WFL all extremities  [  ]  Abnormal [X  ]  Sensation: intact to light touch [ X ] Abnormal [  ]  Reflexes: Symmetric [  ]  Abnormal [  ]    FUNCTIONAL STATUS:  Bed Mobility: Independent [  ]  Supervision [ X ]  Needs Assistance [  ]  N/A [  ]  Transfers: Independent [  ]  Supervision [  ]  Needs Assistance [ X ]  N/A [  ]   Ambulation: Independent [  ]  Supervision [  ]  Needs Assistance [ X ]  N/A [  ]  ADL: Independent [ X ] Requires Assistance [  ] N/A [  ]      LABS:                        9.3    7.34  )-----------( 188      ( 26 Mar 2018 11:23 )             25.6     03-    129<L>  |  96<L>  |  33<H>  ----------------------------<  95  4.6   |  23  |  2.3<H>    Ca    9.0      26 Mar 2018 11:23    TPro  6.0  /  Alb  3.8  /  TBili  0.4  /  DBili  x   /  AST  15  /  ALT  8   /  AlkPhos  77      PT/INR - ( 25 Mar 2018 23:17 )   PT: 13.80 sec;   INR: 1.27 ratio         PTT - ( 25 Mar 2018 23:17 )  PTT:25.7 sec  Urinalysis Basic - ( 25 Mar 2018 23:17 )    Color: Yellow / Appearance: Clear / S.015 / pH: x  Gluc: x / Ketone: Negative  / Bili: Negative / Urobili: 0.2 mg/dL   Blood: x / Protein: 100 mg/dL / Nitrite: Negative   Leuk Esterase: Negative / RBC: 1-2 /HPF / WBC x   Sq Epi: x / Non Sq Epi: x / Bacteria: Few /HPF        RADIOLOGY & ADDITIONAL STUDIES:    EXAM:  XR CHEST FRONTAL 1V            PROCEDURE DATE:  2018            INTERPRETATION:  Clinical History/Reason for Exam:  trauma level 1  Comparison : Chest x-ray-  2007      Findings:    Technique/Positioning:  Rotated to the left    Support devices:  Monitoring devices overlie lung fields, obscuring   underlying anatomy.       Cardiac/mediastinum/hilum: Unremarkable    Lung parenchyma/ Pleura: No focal parenchymal opacities, effusion or   pneumothorax is present.     Likely skinfold overlies right costophrenic angle.      Skeleton/soft tissues: Stable degenerative changes.      Impression:    Rotated to the left.    No consolidation effusion or pneumothorax.        EXAM:  XR PELVIS AP ONLY 1-2 VIEWS            PROCEDURE DATE:  2018            INTERPRETATION:  Clinical history:Trauma      Technique: XR PELVIS AP ONLY 1-2 VIEWS  Comparison: None available    Findings/impression.    Severe degenerative changes of the lower lumbar spine. The pelvic rim is   difficult to evaluate secondary to motion. Please see subsequent CT.          PROCEDURE DATE:  2018            INTERPRETATION:  Clinical History/Reason For Exam: Level 1 trauma    Technique: Multiple contiguous axial CT images were obtained from the   base of the skull to the vertex without administration of intravenous   contrast. Axial, coronal and sagittal images were reviewed.    No studies are available for direct comparison.    Findings:     The ventricles, basal cisterns and sulcal pattern are prominent and   compatible with parenchymal volume loss    The gray-white matter differentiation is preserved. However, chronic   appearing infarct is noted in the left cerebellum.    Patchy hypodensities are seen in the periventricular and subcortical   white matter, nonspecific and without mass-effect, and may represent   areas of microvascular change.    There is no acute mass effect, midline shift or intracranial hemorrhage.      The imaged paranasal sinuses and bilateral mastoid complexes are   unremarkable.    No evidence of a depressed skull fracture.    Beam hardening artifact is noted overlying the brain stem and posterior   fossa which is inherent to CT in this location.      Impression:       No evidence of intracranial hemorrhage, territorial infarct, or mass   effect.    Extensive microvascular ischemic changes.    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        EXAM:  CT CERVICAL SPINE            PROCEDURE DATE:  2018            INTERPRETATION:  Clinical History / Reason for exam: Level 1 trauma    TECHNIQUE:  Contiguous unenhanced CT axial images of the cervical spine   with coronal and sagittal re-formations.    COMPARISON: None available    FINDINGS:    The images are degraded by motion without evidence of a gross fracture or   subluxation.    There is ossification along the anterior longitudinal ligament compatible   with diffuse edema pelvic skeletal hyperostosis.    Multilevel degenerative changes are noted and suboptimally imaged.      IMPRESSION:    Motion degraded nondiagnostic exam      EXAM:  CT ABDOMEN AND PELVIS IC        EXAM:  CT CHEST IC            PROCEDURE DATE:  2018            INTERPRETATION:  CLINICAL HISTORY / REASON FOR EXAM: Trauma.    TECHNIQUE: Multislice helical sections were obtained from the thoracic   inlet through the pelvis with contrast administration and without oral   contrast. Coronal and sagittal images have been submitted for evaluation.    COMPARISON: None.      FINDINGS:     Severely motion degraded exam.    CHEST:    LUNGS, PLEURA, AIRWAYS:Motion limited examination demonstrates apparent   prominence of central pulmonary vasculature structures. No pleural   effusion or pneumothorax is present. There is no evidence of   endobronchial obstruction, bronchiectasis or honeycombing.    THORACIC NODES: No mediastinal or axillary lymphadenopathy      MEDIASTINUM/GREAT VESSELS: No pericardial effusion. The visualized   thyroid gland is unremarkable. Heart size is enlarged. Aorta is normal   caliber. Main pulmonary artery measures 3.8 cm, which may be seen in   setting of pulmonary arterial hypertension.  Small volume of air within the right jugular vein, which may be seen   following catheterization. Aortic valvular and chronic calcifications.    TUBES/LINES: None.    ABDOMEN/ PELVIS:      HEPATOBILIARY: The liver is normal in appearance.  No evidence of intra   or extrahepatic biliary dilatation. The gallbladder is suboptimally   imaged.    SPLEEN: Unremarkable. Nonspecific splenic calcification.    PANCREAS: Unremarkable.    ADRENAL GLANDS: Unremarkable.    KIDNEYS: Bilateral renal cysts. Symmetric pattern of renal enhancement.   No evidence of a mass, hydronephrosis or hydroureter.    ABDOMINOPELVIC NODES: No enlarged abdominal or pelvic lymph nodes.    PELVIC ORGANS: Prostatomegaly, with largest transverse diameter measuring   5.6 cm.    PERITONEUM/MESENTERY/BOWEL: Small hiatal hernia. No bowel obstruction,   ascites or free intraperitoneal air.    BONES/SOFT TISSUES: There are degenerative changes of the spine. No acute  osseous abnormality.    VASCULAR:  The aorta is normal in caliber.      IMPRESSION:      Motion limited examination demonstrating no CT evidence of acute   intrathoracic or intra-abdominal pathology.    Main pulmonary artery dilated to 3.8 cm, which may be seen in setting of   pulmonary arterial hypertension.    Cardiomegaly, and apparent pulmonary edema, likely reflecting a component   of CHF.

## 2018-03-27 NOTE — DISCHARGE NOTE ADULT - MEDICATION SUMMARY - MEDICATIONS TO TAKE
I will START or STAY ON the medications listed below when I get home from the hospital:    FINASTERIDE 5 MG TABLET  -- Indication: For Bladder cancer    aspirin 325 mg oral tablet  -- 1 tab(s) by mouth once a day  -- Indication: For Health care maintenance    LISINOPRIL 20 MG TABLET  -- Indication: For HTN (hypertension)    sodium bicarbonate 650 mg oral tablet  -- 1 tab(s) by mouth once a day  -- Indication: For Bladder cancer    torsemide 5 mg oral tablet  -- 1 tab(s) by mouth once a day  -- Indication: For Bladder cancer

## 2018-03-27 NOTE — PROGRESS NOTE ADULT - ADDITIONAL PE
AAOx3 but has hearing difficulty. face has bruise and ecchymosis on both eyes but right more than left. lungs CTA, CVS RRR, abd soft NT ND BS+ ext NE NT. + gait disturbance.

## 2018-03-27 NOTE — PROGRESS NOTE ADULT - SUBJECTIVE AND OBJECTIVE BOX
SUBJECTIVE:    Patient is a 89y old Male who presents with a chief complaint of Fall   Currently admitted to medicine with the primary diagnosis of Accidental fall, initial encounter  Today is hospital day 2d. This morning he is resting comfortably in bed and reports no new issues or overnight events.     PAST MEDICAL & SURGICAL HISTORY  Chronic kidney disease, unspecified CKD stage  Bladder cancer  HTN (hypertension)  Bladder polyps    SOCIAL HISTORY:  Negative for smoking/alcohol/drug use.     ALLERGIES:  No Known Allergies    MEDICATIONS:  STANDING MEDICATIONS  aspirin  chewable 81 milliGRAM(s) Oral daily  finasteride 5 milliGRAM(s) Oral daily  furosemide    Tablet 20 milliGRAM(s) Oral daily  heparin  Injectable 5000 Unit(s) SubCutaneous every 12 hours    PRN MEDICATIONS    VITALS:   T(F): 98.2  HR: 58  BP: 166/74  RR: 18  SpO2: 98%    LABS:                        10.0   7.35  )-----------( 202      ( 27 Mar 2018 10:49 )             29.0     -    132<L>  |  96<L>  |  34<H>  ----------------------------<  113<H>  4.5   |  24  |  2.4<H>    Ca    9.3      27 Mar 2018 10:49  Phos  3.9       Mg     2.2         TPro  6.0  /  Alb  3.8  /  TBili  0.4  /  DBili  x   /  AST  15  /  ALT  8   /  AlkPhos  77  03-    PT/INR - ( 25 Mar 2018 23:17 )   PT: 13.80 sec;   INR: 1.27 ratio         PTT - ( 25 Mar 2018 23:17 )  PTT:25.7 sec  Urinalysis Basic - ( 25 Mar 2018 23:17 )    Color: Yellow / Appearance: Clear / S.015 / pH: x  Gluc: x / Ketone: Negative  / Bili: Negative / Urobili: 0.2 mg/dL   Blood: x / Protein: 100 mg/dL / Nitrite: Negative   Leuk Esterase: Negative / RBC: 1-2 /HPF / WBC x   Sq Epi: x / Non Sq Epi: x / Bacteria: Few /HPF        Creatine Kinase, Serum: 226 U/L <H> (18 @ 10:49)  Troponin T, Serum: 0.11 ng/mL <HH> (18 @ 10:49)  Creatine Kinase, Serum: 163 U/L (18 @ 18:00)  Troponin T, Serum: 0.13 ng/mL <HH> (18 @ 18:00)      CARDIAC MARKERS ( 27 Mar 2018 10:49 )  x     / 0.11 ng/mL / 226 U/L / x     / 3.4 ng/mL  CARDIAC MARKERS ( 26 Mar 2018 18:00 )  x     / 0.13 ng/mL / 163 U/L / x     / 4.6 ng/mL  CARDIAC MARKERS ( 26 Mar 2018 11:23 )  x     / 0.11 ng/mL / 145 U/L / x     / 4.9 ng/mL  CARDIAC MARKERS ( 25 Mar 2018 23:17 )  x     / 0.06 ng/mL / x     / x     / x          RADIOLOGY:    PHYSICAL EXAM:  GEN: No acute distress, sitting in chair comfortable  LUNGS: Clear to auscultation bilaterally   HEART: S1/S2 present. RRR.   ABD: Soft, non-tender, non-distended.   EXT: NC/NC  NEURO: AAOX3
Patient is a 89y old  Male who presents with a chief complaint of Fall (26 Mar 2018 08:29)    HPI:  This is the case of a 89 YO man presenting to the ED by ambulance for the above CC.  The patient fell face forward while he was trying to get up from bed. There is no clear history of loss of consciousness before or after the fall. Following the fall the patient sustained wounds on his nose and his right inferior eyelid with bruises on his hands. He denies any pain in his upper or lower extremities or on his trunk.   The patient denies any history of chest pain, palpitations or exertional dyspnea. He does complains of edema of the feet and ankles that has been present for many months. He also has a history of recurrent dizziness that occasionally occurs with changes in position.  There is no recent history of fevers or chills. No weight or appetite changes. (26 Mar 2018 08:29)  per wife at the bedside, pt did not have any symptoms before the fall and no LOC before or after the fall. pt  states he is feeling ok and wants to go home.     PAST MEDICAL & SURGICAL HISTORY:  Chronic kidney disease, unspecified CKD stage  Bladder cancer  HTN (hypertension)  Bladder polyps    Vital Signs Last 24 Hrs  T(C): 36.8 (26 Mar 2018 23:32), Max: 36.8 (26 Mar 2018 23:32)  T(F): 98.2 (26 Mar 2018 23:32), Max: 98.2 (26 Mar 2018 23:32)  HR: 58 (26 Mar 2018 23:32) (53 - 58)  BP: 166/74 (26 Mar 2018 23:32) (142/73 - 166/74)  BP(mean): --  RR: 18 (26 Mar 2018 23:32) (18 - 18)  SpO2: 98% (26 Mar 2018 23:32) (96% - 98%)                        10.0   7.35  )-----------( 202      ( 27 Mar 2018 10:49 )             29.0     03-27    132<L>  |  96<L>  |  34<H>  ----------------------------<  113<H>  4.5   |  24  |  2.4<H>    Ca    9.3      27 Mar 2018 10:49  Phos  3.9     03-27  Mg     2.2         TPro  6.0  /  Alb  3.8  /  TBili  0.4  /  DBili  x   /  AST  15  /  ALT  8   /  AlkPhos  77      CARDIAC MARKERS ( 27 Mar 2018 10:49 )  x     / 0.11 ng/mL / 226 U/L / x     / 3.4 ng/mL  CARDIAC MARKERS ( 26 Mar 2018 18:00 )  x     / 0.13 ng/mL / 163 U/L / x     / 4.6 ng/mL  CARDIAC MARKERS ( 26 Mar 2018 11:23 )  x     / 0.11 ng/mL / 145 U/L / x     / 4.9 ng/mL  CARDIAC MARKERS ( 25 Mar 2018 23:17 )  x     / 0.06 ng/mL / x     / x     / x          Urinalysis Basic - ( 25 Mar 2018 23:17 )    Color: Yellow / Appearance: Clear / S.015 / pH: x  Gluc: x / Ketone: Negative  / Bili: Negative / Urobili: 0.2 mg/dL   Blood: x / Protein: 100 mg/dL / Nitrite: Negative   Leuk Esterase: Negative / RBC: 1-2 /HPF / WBC x   Sq Epi: x / Non Sq Epi: x / Bacteria: Few /HPF          MEDICATIONS  (STANDING):  aspirin  chewable 81 milliGRAM(s) Oral daily  finasteride 5 milliGRAM(s) Oral daily  furosemide    Tablet 20 milliGRAM(s) Oral daily  heparin  Injectable 5000 Unit(s) SubCutaneous every 12 hours

## 2018-03-27 NOTE — PROGRESS NOTE ADULT - ASSESSMENT
# mechanical fall  bruising as noted above  cardiac enzymes as above  cardiology on board-cardiac enzymes positive, awaiting further management  trauma w/u negative    #troponinemia  -trend cardiac enzymes  patient denies any chest pain  case d/w dr salcedo- d/c patient with f/u and start metoprolol 12.5 q12h decrease asa to 81mg      # essential htn  c/w lisinopril
89 YO man with a Hx of CKD and HTN presenting for a fall.    Code status: DNR/DNI (family will provide a living will)

## 2018-03-27 NOTE — DISCHARGE NOTE ADULT - PATIENT PORTAL LINK FT
You can access the [facility] Patient Portal, offered by [facility], by registering with the following website: [email or web address]

## 2018-03-27 NOTE — DISCHARGE NOTE ADULT - CARE PROVIDER_API CALL
Oscar Blackwood), Cardiovascular Disease  [address] [place] [address]  Phone: [phone]  Fax: [phone] Oscar Blackwood), Cardiovascular Disease  [address] [place] [address]  Phone: [phone]  Fax: [phone]

## 2018-03-27 NOTE — PROGRESS NOTE ADULT - PROBLEM SELECTOR PLAN 1
NSTMI, conservative management. repeat CE. follow up cardio  rehab consult appreciated, Home with PT ( family preference is home with PT not SNF)

## 2018-03-27 NOTE — DISCHARGE NOTE ADULT - CARE PLAN
Principal Discharge DX:	Accidental fall, initial encounter  Goal:	mechanical fall  Assessment and plan of treatment:	c/w medications as prescribed and follow up with your PMD  Secondary Diagnosis:	Troponin level elevated  Goal:	resolution/monitoring  Assessment and plan of treatment:	follow up with cardiology as soon as possible

## 2018-03-27 NOTE — DISCHARGE NOTE ADULT - HOSPITAL COURSE
This is the case of a 89 YO man presenting to the ED by ambulance for the above CC.  The patient fell face forward while he was trying to get up from bed. There is no clear history of loss of consciousness before or after the fall. Following the fall the patient sustained wounds on his nose and his right inferior eyelid with bruises on his hands. He denies any pain in his upper or lower extremities or on his trunk.   The patient denies any history of chest pain, palpitations or exertional dyspnea. He does complains of edema of the feet and ankles that has been present for many months. He also has a history of recurrent dizziness that occasionally occurs with changes in position.  There is no recent history of fevers or chills. No weight or appetite changes. during workup patient found to have positive troponins likley NSTEMI type II     per wife at the bedside, pt did not have any symptoms before the fall and no LOC before or after the fall. pt  states he is feeling ok and wants to go home. This is the case of a 89 YO man presenting to the ED by ambulance for the mechanical fall.  The patient fell face forward while he was trying to get up from bed. There is no clear history of loss of consciousness before or after the fall. Following the fall the patient sustained wounds on his nose and his right inferior eyelid with bruises on his hands. He denies any pain in his upper or lower extremities or on his trunk.   The patient denies any history of chest pain, palpitations or exertional dyspnea. He does complains of edema of the feet and ankles that has been present for many months. He also has a history of recurrent dizziness that occasionally occurs with changes in position.  There is no recent history of fevers or chills. No weight or appetite changes. during workup patient found to have positive troponins likley NSTEMI type II     per wife at the bedside, pt did not have any symptoms before the fall and no LOC before or after the fall. pt  states he is feeling ok and wants to go home.

## 2018-03-27 NOTE — CONSULT NOTE ADULT - ASSESSMENT
IMPRESSION: Rehab for vertigo    PRECAUTIONS: [  ] Cardiac  [  ] Respiratory  [  ] Seizures [  ] Contact Isolation  [  ] Droplet Isolation  [  ] Other    Weight Bearing Status:     RECOMMENDATION:    Out of Bed to Chair     DVT/Decubiti Prophylaxis    REHAB PLAN:     [ X  ] Bedside P/T 3-5 times a week   [   ]   Bedside O/T  2-3 times a week             [   ] No Rehab Therapy Indicated                   [   ]  Speech Therapy   Conditioning/ROM                                    ADL  Bed Mobility                                               Conditioning/ROM  Transfers                                                     Bed Mobility  Sitting /Standing Balance                         Transfers                                        Gait Training                                               Sitting/Standing Balance  Stair Training [ X  ]Applicable                    Home equipment Eval                                                                        Splinting  [   ] Only      GOALS:   ADL   [   ]   Independent                    Transfers  [  X ] Independent                          Ambulation  [ X  ] Independent     [   X ] With device                            [   ]  CG                                                         [   ]  CG                                                                  [   ] CG                            [    ] Min A                                                   [   ] Min A                                                              [   ] Min  A          DISCHARGE PLAN:   [   ]  Good candidate for Intensive Rehabilitation/Hospital based-[facility]A [facility]                                             Will tolerate 3hrs Intensive Rehab Daily                                       [    ]  Short Term Rehab in Skilled Nursing Facility                                       [    ]  Home with Outpatient or VN services                                         [    ]  Possible Candidate for Intensive Hospital based Rehab      Thank you. IMPRESSION: Rehab for vertigo    PRECAUTIONS: [  ] Cardiac  [  ] Respiratory  [  ] Seizures [  ] Contact Isolation  [  ] Droplet Isolation  [  ] Other    Weight Bearing Status:     RECOMMENDATION:    Out of Bed to Chair     DVT/Decubiti Prophylaxis    REHAB PLAN:     [ X  ] Bedside P/T 3-5 times a week   [   ]   Bedside O/T  2-3 times a week             [   ] No Rehab Therapy Indicated                   [   ]  Speech Therapy   Conditioning/ROM                                    ADL  Bed Mobility                                               Conditioning/ROM  Transfers                                                     Bed Mobility  Sitting /Standing Balance                         Transfers                                        Gait Training                                               Sitting/Standing Balance  Stair Training [ X  ]Applicable                    Home equipment Eval                                                                        Splinting  [   ] Only      GOALS:   ADL   [   ]   Independent                    Transfers  [  X ] Independent                          Ambulation  [ X  ] Independent     [   X ] With device                            [   ]  CG                                                         [   ]  CG                                                                  [   ] CG                            [    ] Min A                                                   [   ] Min A                                                              [   ] Min  A          DISCHARGE PLAN:   [   ]  Good candidate for Intensive Rehabilitation/Hospital based-4A [facility]                                             Will tolerate 3hrs Intensive Rehab Daily                                       [  X  ]  Short Term Rehab in Skilled Nursing Facility--suggested, but family would prefer therapy at home                                       [  X  ]  Home with Outpatient or  services                                         [    ]  Possible Candidate for Intensive Hospital based Rehab      Thank you. IMPRESSION: Rehab for vertigo    PRECAUTIONS: [  ] Cardiac  [  ] Respiratory  [  ] Seizures [  ] Contact Isolation  [  ] Droplet Isolation  [  ] Other    Weight Bearing Status: WBAT    RECOMMENDATION:    Out of Bed to Chair     DVT/Decubiti Prophylaxis    REHAB PLAN:     [ X  ] Bedside P/T 3-5 times a week   [   ]   Bedside O/T  2-3 times a week             [   ] No Rehab Therapy Indicated                   [   ]  Speech Therapy   Conditioning/ROM                                    ADL  Bed Mobility                                               Conditioning/ROM  Transfers                                                     Bed Mobility  Sitting /Standing Balance                         Transfers                                        Gait Training                                               Sitting/Standing Balance  Stair Training [ X  ]Applicable                    Home equipment Eval                                                                        Splinting  [   ] Only      GOALS:   ADL   [   ]   Independent                    Transfers  [  X ] Independent                          Ambulation  [ X  ] Independent     [   X ] With device                            [   ]  CG                                                         [   ]  CG                                                                  [   ] CG                            [    ] Min A                                                   [   ] Min A                                                              [   ] Min  A          DISCHARGE PLAN:   [   ]  Good candidate for Intensive Rehabilitation/Hospital based-[facility]A [facility]                                             Will tolerate 3hrs Intensive Rehab Daily                                       [  X  ]  Short Term Rehab in Skilled Nursing Facility--suggested, but family would prefer therapy at home                                       [  X  ]  Home with Outpatient or  services                                         [    ]  Possible Candidate for Intensive Hospital based Rehab      Thank you.

## 2018-03-27 NOTE — DISCHARGE NOTE ADULT - PLAN OF CARE
resolution/monitoring follow up with cardiology as soon as possible mechanical fall c/w medications as prescribed and follow up with your PMD
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
## 2018-03-29 DIAGNOSIS — S60.222A CONTUSION OF LEFT HAND, INITIAL ENCOUNTER: ICD-10-CM

## 2018-03-29 DIAGNOSIS — N18.9 CHRONIC KIDNEY DISEASE, UNSPECIFIED: ICD-10-CM

## 2018-03-29 DIAGNOSIS — Z85.51 PERSONAL HISTORY OF MALIGNANT NEOPLASM OF BLADDER: ICD-10-CM

## 2018-03-29 DIAGNOSIS — S60.221A CONTUSION OF RIGHT HAND, INITIAL ENCOUNTER: ICD-10-CM

## 2018-03-29 DIAGNOSIS — S01.111A LACERATION WITHOUT FOREIGN BODY OF RIGHT EYELID AND PERIOCULAR AREA, INITIAL ENCOUNTER: ICD-10-CM
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
## 2018-03-29 DIAGNOSIS — Z66 DO NOT RESUSCITATE: ICD-10-CM

## 2018-03-29 DIAGNOSIS — I13.0 HYPERTENSIVE HEART AND CHRONIC KIDNEY DISEASE WITH HEART FAILURE AND STAGE 1 THROUGH STAGE 4 CHRONIC KIDNEY DISEASE, OR UNSPECIFIED CHRONIC KIDNEY DISEASE: ICD-10-CM

## 2018-03-29 DIAGNOSIS — N40.0 BENIGN PROSTATIC HYPERPLASIA WITHOUT LOWER URINARY TRACT SYMPTOMS: ICD-10-CM

## 2018-03-29 DIAGNOSIS — E87.1 HYPO-OSMOLALITY AND HYPONATREMIA: ICD-10-CM

## 2018-03-29 DIAGNOSIS — I50.9 HEART FAILURE, UNSPECIFIED: ICD-10-CM
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
## 2018-03-29 DIAGNOSIS — I21.A1 MYOCARDIAL INFARCTION TYPE 2: ICD-10-CM

## 2018-03-29 DIAGNOSIS — Z87.891 PERSONAL HISTORY OF NICOTINE DEPENDENCE: ICD-10-CM

## 2018-03-29 DIAGNOSIS — S01.21XA LACERATION WITHOUT FOREIGN BODY OF NOSE, INITIAL ENCOUNTER: ICD-10-CM

## 2018-04-01 ENCOUNTER — EMERGENCY (EMERGENCY)
Facility: HOSPITAL | Age: 83
LOS: 0 days | Discharge: HOME | End: 2018-04-01

## 2018-04-01 VITALS
OXYGEN SATURATION: 100 % | TEMPERATURE: 98 F | RESPIRATION RATE: 20 BRPM | DIASTOLIC BLOOD PRESSURE: 83 MMHG | SYSTOLIC BLOOD PRESSURE: 159 MMHG | HEART RATE: 78 BPM

## 2018-04-01 DIAGNOSIS — S01.81XD LACERATION WITHOUT FOREIGN BODY OF OTHER PART OF HEAD, SUBSEQUENT ENCOUNTER: ICD-10-CM

## 2018-04-01 DIAGNOSIS — I10 ESSENTIAL (PRIMARY) HYPERTENSION: ICD-10-CM
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
## 2018-04-01 DIAGNOSIS — Z79.899 OTHER LONG TERM (CURRENT) DRUG THERAPY: ICD-10-CM

## 2018-04-01 DIAGNOSIS — D41.4 NEOPLASM OF UNCERTAIN BEHAVIOR OF BLADDER: Chronic | ICD-10-CM

## 2018-04-01 DIAGNOSIS — Z79.82 LONG TERM (CURRENT) USE OF ASPIRIN: ICD-10-CM
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
## 2018-04-01 PROBLEM — C67.9 MALIGNANT NEOPLASM OF BLADDER, UNSPECIFIED: Chronic | Status: ACTIVE | Noted: 2018-03-26

## 2018-04-01 NOTE — ED PROVIDER NOTE - NEURO NEGATIVE STATEMENT, MLM
no loss of consciousness, no new gait abnormality, no headache, no sensory deficits, and no weakness.

## 2018-04-01 NOTE — ED PROVIDER NOTE - SKIN, MLM
+ two sutures intact along nasal bridge without surrounding erythema ; no warmth ; no drainage ; + five sutures intace below right orbit with overlying scab without erythema/warmth/drainage

## 2018-04-04 PROBLEM — Z00.00 ENCOUNTER FOR PREVENTIVE HEALTH EXAMINATION: Status: ACTIVE | Noted: 2018-04-04

## 2018-11-15 ENCOUNTER — APPOINTMENT (OUTPATIENT)
Dept: OTOLARYNGOLOGY | Facility: CLINIC | Age: 83
End: 2018-11-15

## 2019-01-10 ENCOUNTER — INPATIENT (INPATIENT)
Facility: HOSPITAL | Age: 84
LOS: 3 days | End: 2019-01-14
Attending: HOSPITALIST | Admitting: HOSPITALIST
Payer: MEDICARE

## 2019-01-10 VITALS
SYSTOLIC BLOOD PRESSURE: 172 MMHG | DIASTOLIC BLOOD PRESSURE: 77 MMHG | HEART RATE: 68 BPM | RESPIRATION RATE: 18 BRPM | TEMPERATURE: 97 F | OXYGEN SATURATION: 96 %

## 2019-01-10 DIAGNOSIS — N17.9 ACUTE KIDNEY FAILURE, UNSPECIFIED: ICD-10-CM

## 2019-01-10 DIAGNOSIS — Z85.51 PERSONAL HISTORY OF MALIGNANT NEOPLASM OF BLADDER: ICD-10-CM

## 2019-01-10 DIAGNOSIS — I13.0 HYPERTENSIVE HEART AND CHRONIC KIDNEY DISEASE WITH HEART FAILURE AND STAGE 1 THROUGH STAGE 4 CHRONIC KIDNEY DISEASE, OR UNSPECIFIED CHRONIC KIDNEY DISEASE: ICD-10-CM

## 2019-01-10 DIAGNOSIS — I50.33 ACUTE ON CHRONIC DIASTOLIC (CONGESTIVE) HEART FAILURE: ICD-10-CM
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
## 2019-01-10 DIAGNOSIS — R91.1 SOLITARY PULMONARY NODULE: ICD-10-CM
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
## 2019-01-10 DIAGNOSIS — Z87.891 PERSONAL HISTORY OF NICOTINE DEPENDENCE: ICD-10-CM

## 2019-01-10 DIAGNOSIS — I35.0 NONRHEUMATIC AORTIC (VALVE) STENOSIS: ICD-10-CM

## 2019-01-10 DIAGNOSIS — D41.4 NEOPLASM OF UNCERTAIN BEHAVIOR OF BLADDER: Chronic | ICD-10-CM

## 2019-01-10 DIAGNOSIS — I21.4 NON-ST ELEVATION (NSTEMI) MYOCARDIAL INFARCTION: ICD-10-CM

## 2019-01-10 DIAGNOSIS — N18.4 CHRONIC KIDNEY DISEASE, STAGE 4 (SEVERE): ICD-10-CM

## 2019-01-10 DIAGNOSIS — E87.5 HYPERKALEMIA: ICD-10-CM

## 2019-01-10 DIAGNOSIS — E87.0 HYPEROSMOLALITY AND HYPERNATREMIA: ICD-10-CM

## 2019-01-10 DIAGNOSIS — I34.0 NONRHEUMATIC MITRAL (VALVE) INSUFFICIENCY: ICD-10-CM
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
## 2019-01-10 DIAGNOSIS — E78.5 HYPERLIPIDEMIA, UNSPECIFIED: ICD-10-CM

## 2019-01-10 DIAGNOSIS — Z51.5 ENCOUNTER FOR PALLIATIVE CARE: ICD-10-CM

## 2019-01-10 DIAGNOSIS — S22.32XA FRACTURE OF ONE RIB, LEFT SIDE, INITIAL ENCOUNTER FOR CLOSED FRACTURE: ICD-10-CM

## 2019-01-10 DIAGNOSIS — F03.90 UNSPECIFIED DEMENTIA WITHOUT BEHAVIORAL DISTURBANCE: ICD-10-CM

## 2019-01-10 DIAGNOSIS — I37.1 NONRHEUMATIC PULMONARY VALVE INSUFFICIENCY: ICD-10-CM

## 2019-01-10 PROBLEM — N18.9 CHRONIC KIDNEY DISEASE, UNSPECIFIED: Chronic | Status: ACTIVE | Noted: 2018-03-26

## 2019-01-10 LAB
ALBUMIN SERPL ELPH-MCNC: 4 G/DL — SIGNIFICANT CHANGE UP (ref 3.5–5.2)
ALP SERPL-CCNC: 99 U/L — SIGNIFICANT CHANGE UP (ref 30–115)
ALT FLD-CCNC: 19 U/L — SIGNIFICANT CHANGE UP (ref 0–41)
ANION GAP SERPL CALC-SCNC: 20 MMOL/L — HIGH (ref 7–14)
ANION GAP SERPL CALC-SCNC: 23 MMOL/L — HIGH (ref 7–14)
APPEARANCE UR: CLEAR — SIGNIFICANT CHANGE UP
AST SERPL-CCNC: 24 U/L — SIGNIFICANT CHANGE UP (ref 0–41)
BASOPHILS # BLD AUTO: 0.07 K/UL — SIGNIFICANT CHANGE UP (ref 0–0.2)
BASOPHILS NFR BLD AUTO: 1 % — SIGNIFICANT CHANGE UP (ref 0–1)
BILIRUB SERPL-MCNC: 0.9 MG/DL — SIGNIFICANT CHANGE UP (ref 0.2–1.2)
BILIRUB UR-MCNC: NEGATIVE — SIGNIFICANT CHANGE UP
BUN SERPL-MCNC: 77 MG/DL — CRITICAL HIGH (ref 10–20)
BUN SERPL-MCNC: 81 MG/DL — CRITICAL HIGH (ref 10–20)
CALCIUM SERPL-MCNC: 10.1 MG/DL — SIGNIFICANT CHANGE UP (ref 8.5–10.1)
CALCIUM SERPL-MCNC: 10.2 MG/DL — HIGH (ref 8.5–10.1)
CHLORIDE SERPL-SCNC: 102 MMOL/L — SIGNIFICANT CHANGE UP (ref 98–110)
CHLORIDE SERPL-SCNC: 104 MMOL/L — SIGNIFICANT CHANGE UP (ref 98–110)
CK MB CFR SERPL CALC: 3.5 NG/ML — SIGNIFICANT CHANGE UP (ref 0.6–6.3)
CK SERPL-CCNC: 163 U/L — SIGNIFICANT CHANGE UP (ref 0–225)
CO2 SERPL-SCNC: 18 MMOL/L — SIGNIFICANT CHANGE UP (ref 17–32)
CO2 SERPL-SCNC: 20 MMOL/L — SIGNIFICANT CHANGE UP (ref 17–32)
COLOR SPEC: YELLOW — SIGNIFICANT CHANGE UP
CREAT SERPL-MCNC: 3.7 MG/DL — HIGH (ref 0.7–1.5)
CREAT SERPL-MCNC: 3.8 MG/DL — HIGH (ref 0.7–1.5)
DIFF PNL FLD: ABNORMAL
EOSINOPHIL # BLD AUTO: 0.05 K/UL — SIGNIFICANT CHANGE UP (ref 0–0.7)
EOSINOPHIL NFR BLD AUTO: 0.7 % — SIGNIFICANT CHANGE UP (ref 0–8)
EPI CELLS # UR: ABNORMAL /HPF
GLUCOSE SERPL-MCNC: 93 MG/DL — SIGNIFICANT CHANGE UP (ref 70–99)
GLUCOSE SERPL-MCNC: 95 MG/DL — SIGNIFICANT CHANGE UP (ref 70–99)
GLUCOSE UR QL: NEGATIVE MG/DL — SIGNIFICANT CHANGE UP
HCT VFR BLD CALC: 35.3 % — LOW (ref 42–52)
HGB BLD-MCNC: 11.4 G/DL — LOW (ref 14–18)
IMM GRANULOCYTES NFR BLD AUTO: 0.6 % — HIGH (ref 0.1–0.3)
KETONES UR-MCNC: NEGATIVE — SIGNIFICANT CHANGE UP
LEUKOCYTE ESTERASE UR-ACNC: NEGATIVE — SIGNIFICANT CHANGE UP
LYMPHOCYTES # BLD AUTO: 0.77 K/UL — LOW (ref 1.2–3.4)
LYMPHOCYTES # BLD AUTO: 10.8 % — LOW (ref 20.5–51.1)
MCHC RBC-ENTMCNC: 31.8 PG — HIGH (ref 27–31)
MCHC RBC-ENTMCNC: 32.3 G/DL — SIGNIFICANT CHANGE UP (ref 32–37)
MCV RBC AUTO: 98.6 FL — HIGH (ref 80–94)
MONOCYTES # BLD AUTO: 0.45 K/UL — SIGNIFICANT CHANGE UP (ref 0.1–0.6)
MONOCYTES NFR BLD AUTO: 6.3 % — SIGNIFICANT CHANGE UP (ref 1.7–9.3)
NEUTROPHILS # BLD AUTO: 5.74 K/UL — SIGNIFICANT CHANGE UP (ref 1.4–6.5)
NEUTROPHILS NFR BLD AUTO: 80.6 % — HIGH (ref 42.2–75.2)
NITRITE UR-MCNC: NEGATIVE — SIGNIFICANT CHANGE UP
NRBC # BLD: 0 /100 WBCS — SIGNIFICANT CHANGE UP (ref 0–0)
NT-PROBNP SERPL-SCNC: HIGH PG/ML (ref 0–300)
PH UR: 6 — SIGNIFICANT CHANGE UP (ref 5–8)
PLATELET # BLD AUTO: 190 K/UL — SIGNIFICANT CHANGE UP (ref 130–400)
POTASSIUM SERPL-MCNC: 5.4 MMOL/L — HIGH (ref 3.5–5)
POTASSIUM SERPL-MCNC: 5.5 MMOL/L — HIGH (ref 3.5–5)
POTASSIUM SERPL-SCNC: 5.4 MMOL/L — HIGH (ref 3.5–5)
POTASSIUM SERPL-SCNC: 5.5 MMOL/L — HIGH (ref 3.5–5)
PROT SERPL-MCNC: 6.9 G/DL — SIGNIFICANT CHANGE UP (ref 6–8)
PROT UR-MCNC: 100 MG/DL
RBC # BLD: 3.58 M/UL — LOW (ref 4.7–6.1)
RBC # FLD: 14.7 % — HIGH (ref 11.5–14.5)
RBC CASTS # UR COMP ASSIST: ABNORMAL /HPF
SODIUM SERPL-SCNC: 143 MMOL/L — SIGNIFICANT CHANGE UP (ref 135–146)
SODIUM SERPL-SCNC: 144 MMOL/L — SIGNIFICANT CHANGE UP (ref 135–146)
SP GR SPEC: 1.01 — SIGNIFICANT CHANGE UP (ref 1.01–1.03)
TROPONIN T SERPL-MCNC: 0.09 NG/ML — CRITICAL HIGH
TROPONIN T SERPL-MCNC: 0.11 NG/ML — CRITICAL HIGH
UROBILINOGEN FLD QL: 1 MG/DL (ref 0.2–0.2)
WBC # BLD: 7.12 K/UL — SIGNIFICANT CHANGE UP (ref 4.8–10.8)
WBC # FLD AUTO: 7.12 K/UL — SIGNIFICANT CHANGE UP (ref 4.8–10.8)

## 2019-01-10 PROCEDURE — 99221 1ST HOSP IP/OBS SF/LOW 40: CPT

## 2019-01-10 RX ORDER — ASPIRIN/CALCIUM CARB/MAGNESIUM 324 MG
81 TABLET ORAL DAILY
Qty: 0 | Refills: 0 | Status: DISCONTINUED | OUTPATIENT
Start: 2019-01-10 | End: 2019-01-11

## 2019-01-10 RX ORDER — ACETAMINOPHEN 500 MG
650 TABLET ORAL EVERY 4 HOURS
Qty: 0 | Refills: 0 | Status: DISCONTINUED | OUTPATIENT
Start: 2019-01-10 | End: 2019-01-14

## 2019-01-10 RX ORDER — FINASTERIDE 5 MG/1
5 TABLET, FILM COATED ORAL DAILY
Qty: 0 | Refills: 0 | Status: DISCONTINUED | OUTPATIENT
Start: 2019-01-10 | End: 2019-01-11

## 2019-01-10 RX ORDER — FUROSEMIDE 40 MG
40 TABLET ORAL
Qty: 0 | Refills: 0 | Status: DISCONTINUED | OUTPATIENT
Start: 2019-01-10 | End: 2019-01-11

## 2019-01-10 RX ORDER — ATORVASTATIN CALCIUM 80 MG/1
40 TABLET, FILM COATED ORAL AT BEDTIME
Qty: 0 | Refills: 0 | Status: DISCONTINUED | OUTPATIENT
Start: 2019-01-10 | End: 2019-01-11

## 2019-01-10 RX ORDER — FUROSEMIDE 40 MG
40 TABLET ORAL ONCE
Qty: 0 | Refills: 0 | Status: DISCONTINUED | OUTPATIENT
Start: 2019-01-10 | End: 2019-01-10

## 2019-01-10 RX ORDER — HEPARIN SODIUM 5000 [USP'U]/ML
5000 INJECTION INTRAVENOUS; SUBCUTANEOUS EVERY 8 HOURS
Qty: 0 | Refills: 0 | Status: DISCONTINUED | OUTPATIENT
Start: 2019-01-10 | End: 2019-01-11

## 2019-01-10 RX ORDER — METOPROLOL TARTRATE 50 MG
12.5 TABLET ORAL
Qty: 0 | Refills: 0 | Status: DISCONTINUED | OUTPATIENT
Start: 2019-01-10 | End: 2019-01-11

## 2019-01-10 RX ORDER — SODIUM BICARBONATE 1 MEQ/ML
650 SYRINGE (ML) INTRAVENOUS DAILY
Qty: 0 | Refills: 0 | Status: DISCONTINUED | OUTPATIENT
Start: 2019-01-10 | End: 2019-01-11

## 2019-01-10 RX ORDER — FUROSEMIDE 40 MG
40 TABLET ORAL DAILY
Qty: 0 | Refills: 0 | Status: DISCONTINUED | OUTPATIENT
Start: 2019-01-10 | End: 2019-01-10

## 2019-01-10 RX ADMIN — Medication 40 MILLIGRAM(S): at 19:42

## 2019-01-10 RX ADMIN — Medication 650 MILLIGRAM(S): at 21:37

## 2019-01-10 RX ADMIN — ATORVASTATIN CALCIUM 40 MILLIGRAM(S): 80 TABLET, FILM COATED ORAL at 21:37

## 2019-01-10 RX ADMIN — HEPARIN SODIUM 5000 UNIT(S): 5000 INJECTION INTRAVENOUS; SUBCUTANEOUS at 21:53

## 2019-01-10 NOTE — ED PROVIDER NOTE - CARE PLAN
Principal Discharge DX:	MARILYN (acute kidney injury)  Secondary Diagnosis:	Lower extremity edema  Secondary Diagnosis:	Weakness  Secondary Diagnosis:	Closed fracture of one rib of left side, initial encounter Principal Discharge DX:	MARILYN (acute kidney injury)  Secondary Diagnosis:	Lower extremity edema  Secondary Diagnosis:	Weakness  Secondary Diagnosis:	Closed fracture of one rib of left side, initial encounter  Secondary Diagnosis:	Bilateral pleural effusion  Secondary Diagnosis:	Pulmonary nodules

## 2019-01-10 NOTE — CONSULT NOTE ADULT - SUBJECTIVE AND OBJECTIVE BOX
Patient is a 89y old  Male who presents with a chief complaint of Mechanical fall/weakness (10 Caesar 2019 17:46)      HPI:  89 yo with PMHx of CHF, HTN, HLD, CKD  dementia, h/o bladder cancer and radiation on remission, presents s/p mechanical fall. As per history obtained from daughter at bedside, Pt. was trying to get his walker when he fell off balance and fell on his back , but did not hit his head on floor , no LOC. Denies any warning symptoms s/a feeling dizzy or lightheaded/diaphoresis/palpitations/chest pain. States having swelling in left lower extremity and chronic LE swelling, intermittent sob ( on lasix prn)  . As per wife patient has been progressively declining in his functional status lately and has been more confused /disoriented lately .Denies fever/chills/n/v/abd. pain/urinary symptoms/focal weaknesses.  At baseline, he walks with walker, and his daughter/wife helps with daily activities.   In ED trauma work up was negative except for L 7th rib fracture which does not require surgical intervention. (10 Caesar 2019 17:46)      PAST MEDICAL & SURGICAL HISTORY:  Chronic kidney disease, unspecified CKD stage  Bladder cancer  HTN (hypertension)  Bladder polyps      PREVIOUS DIAGNOSTIC TESTING:      ECHO  FINDINGS:    STRESS  FINDINGS:    CATHETERIZATION  FINDINGS:    MEDICATIONS  (STANDING):  aspirin enteric coated 81 milliGRAM(s) Oral daily  atorvastatin 40 milliGRAM(s) Oral at bedtime  finasteride 5 milliGRAM(s) Oral daily  furosemide   Injectable 40 milliGRAM(s) IV Push daily  heparin  Injectable 5000 Unit(s) SubCutaneous every 8 hours  metoprolol tartrate 12.5 milliGRAM(s) Oral two times a day  sodium bicarbonate 650 milliGRAM(s) Oral daily    MEDICATIONS  (PRN):  acetaminophen   Tablet .. 650 milliGRAM(s) Oral every 4 hours PRN Moderate Pain (4 - 6)      FAMILY HISTORY:  No pertinent family history in first degree relatives      SOCIAL HISTORY:  CIGARETTES:    ALCOHOL:    REVIEW OF SYSTEMS:  CONSTITUTIONAL: No fever, weight loss, or fatigue  NECK: No pain or stiffness  RESPIRATORY: No cough, wheezing, chills or hemoptysis; No shortness of breath  CARDIOVASCULAR: No chest pain, palpitations, dizziness, or leg swelling  GASTROINTESTINAL: No abdominal or epigastric pain. No nausea, vomiting, or hematemesis; No diarrhea or constipation. No melena or hematochezia.  GENITOURINARY: No dysuria, frequency, hematuria, or incontinence  NEUROLOGICAL: No headaches, memory loss, loss of strength, numbness, or tremors  SKIN: No itching, burning, rashes, or lesions   ENDOCRINE: No heat or cold intolerance; No hair loss  MUSCULOSKELETAL: No joint pain or swelling; No muscle, back, or extremity pain  HEME/LYMPH: No easy bruising, or bleeding gums          Vital Signs Last 24 Hrs  T(C): 36.5 (10 Caesar 2019 19:00), Max: 36.5 (10 Caesar 2019 19:00)  T(F): 97.7 (10 Caesar 2019 19:00), Max: 97.7 (10 Caesar 2019 19:00)  HR: 104 (10 Caesar 2019 19:00) (68 - 104)  BP: 179/81 (10 Caesar 2019 19:00) (165/73 - 179/81)  BP(mean): 117 (10 Caesar 2019 19:00) (117 - 117)  RR: 18 (10 Caesar 2019 19:00) (18 - 20)  SpO2: 94% (10 Caesar 2019 16:06) (94% - 96%)        PHYSICAL EXAM:  GENERAL: NAD, well-groomed, well-developed  HEAD:  Atraumatic, Normocephalic  NECK: Supple, No JVD, Normal thyroid  NERVOUS SYSTEM:  Alert & Oriented X3, Good concentration  CHEST/LUNG: Clear to percussion bilaterally; No rales, rhonchi, wheezing, or rubs  HEART: Regular rate and rhythm; No murmurs, rubs, or gallops  ABDOMEN: Soft, Nontender, Nondistended; Bowel sounds present  EXTREMITIES:  2+ Peripheral Pulses, No clubbing, cyanosis, or edema  SKIN: No rashes or lesions    INTERPRETATION OF TELEMETRY:    ECG:    I&O's Detail      LABS:                        11.4   7.12  )-----------( 190      ( 10 Caesar 2019 10:49 )             35.3     01-10    144  |  104  |  77<HH>  ----------------------------<  95  5.4<H>   |  20  |  3.8<H>    Ca    10.1      10 Caesar 2019 10:49    TPro  6.9  /  Alb  4.0  /  TBili  0.9  /  DBili  x   /  AST  24  /  ALT  19  /  AlkPhos  99  01-10    CARDIAC MARKERS ( 10 Caesar 2019 10:49 )  x     / 0.09 ng/mL / x     / x     / x            Urinalysis Basic - ( 10 Caesar 2019 10:30 )    Color: Yellow / Appearance: Clear / S.015 / pH: x  Gluc: x / Ketone: Negative  / Bili: Negative / Urobili: 1.0 mg/dL   Blood: x / Protein: 100 mg/dL / Nitrite: Negative   Leuk Esterase: Negative / RBC: 26-50 /HPF / WBC x   Sq Epi: x / Non Sq Epi: Occasional /HPF / Bacteria: x      I&O's Summary      RADIOLOGY & ADDITIONAL STUDIES:

## 2019-01-10 NOTE — H&P ADULT - NSHPLABSRESULTS_GEN_ALL_CORE
11.4   7.12  )-----------( 190      ( 10 Caesar 2019 10:49 )             35.3       01-10    144  |  104  |  77<HH>  ----------------------------<  95  5.4<H>   |  20  |  3.8<H>    Ca    10.1      10 Caesar 2019 10:49    TPro  6.9  /  Alb  4.0  /  TBili  0.9  /  DBili  x   /  AST  24  /  ALT  19  /  AlkPhos  99  01-10        Urinalysis Basic - ( 10 Caesar 2019 10:30 )    Color: Yellow / Appearance: Clear / S.015 / pH: x  Gluc: x / Ketone: Negative  / Bili: Negative / Urobili: 1.0 mg/dL   Blood: x / Protein: 100 mg/dL / Nitrite: Negative   Leuk Esterase: Negative / RBC: 26-50 /HPF / WBC x   Sq Epi: x / Non Sq Epi: Occasional /HPF / Bacteria: x    CARDIAC MARKERS ( 10 Caesar 2019 10:49 )  x     / 0.09 ng/mL / x     / x     / x

## 2019-01-10 NOTE — ED ADULT NURSE NOTE - NSIMPLEMENTINTERV_GEN_ALL_ED
Implemented All Fall with Harm Risk Interventions:  [place] to call system. Call bell, personal items and telephone within reach. Instruct patient to call for assistance. Room bathroom lighting operational. Non-slip footwear when patient is off stretcher. Physically safe environment: no spills, clutter or unnecessary equipment. Stretcher in lowest position, wheels locked, appropriate side rails in place. Provide visual cue, wrist band, yellow gown, etc. Monitor gait and stability. Monitor for mental status changes and reorient to person, place, and time. Review medications for side effects contributing to fall risk. Reinforce activity limits and safety measures with patient and family. Provide visual clues: red socks.

## 2019-01-10 NOTE — ED PROVIDER NOTE - NS ED ROS FT
Constitutional: No fever or chills. Normal appetite. No unintended weight loss.   Eyes: No vision changes.  ENT: No hearing changes. No ear pain. No sore throat.  Neck: No neck pain or stiffness.  Cardiovascular: No chest pain, palpitations, or edema.  Pulmonary: No cough or SOB. No hemoptysis.  Abdominal: Generalized abdominal pain without nausea, vomiting, or diarrhea.   : No dysuria or frequency. No hematuria.   Neuro: No headache, syncope, or dizziness.  MS: No back pain. No calf pain/swelling. bilateral leg pain.   Psych: No suicidal or homicidal ideations.

## 2019-01-10 NOTE — H&P ADULT - HISTORY OF PRESENT ILLNESS
89 yo with PMHx of CHF, HTN, HLD, CKD  dementia, h/o bladder cancer and radiation on remission, presents    Multiple falls from standing while using walker at home during the last week and was more confused especially today. No LOC. Fell today in am and wife saw that he fell backwards from standing while using walker and hit his back, no LOC. Presented by EMS for evaluation. Vitals stable, does not complain on pain but he is slow to respond. Family at bedside. 89 yo with PMHx of CHF, HTN, HLD, CKD  dementia, h/o bladder cancer and radiation on remission, presents s/p mechanical fall. As per history obtained from daughter at bedside, Pt. was trying to get his walker when he fell off balance and fell on his back , but did not hit his head on floor , no LOC. Denies any warning symptoms s/a feeling dizzy or lightheaded/diaphoresis/palpitations/chest pain. States having swelling in left lower extremity and chronic LE swelling, intermittent sob ( on lasix prn)  . As per wife patient has been progressively declining in his functional status lately and has been more confused /disoriented lately .Denies fever/chills/n/v/abd. pain/urinary symptoms/focal weaknesses.  At baseline, he walks with walker, and his daughter/wife helps with daily activities.   In ED trauma work up was negative except for L 7th rib fracture which does not require surgical intervention.

## 2019-01-10 NOTE — ED PROVIDER NOTE - ATTENDING CONTRIBUTION TO CARE
89 year old male, pmhx bladder CA, HTN, CKD, presenting s/p fall this AM. Patient has hx of frequent falls, and per family this fall was similar to prior with patient losing his balance. States fall was witnessed without LOC. Patient is poor historian but denies any pain except for his ribs bilaterally. Family states patient has been more difficult to take care of at home and he has been having dyspnea with exertion and leg swelling over the past few weeks that has been worsening as well. Otherwise denies fevers, headache, vision changes, weakness/numbness, confusion, URI symptoms, neck pain, chest pain, back pain, cough, palpitations, nausea, vomiting, abdominal pain, diarrhea, constipation, blood in stool/dark stools, urinary symptoms, penile discharge/testicular pain, leg swelling, rash, recent travel or sick contacts.    Vital Signs: I have reviewed the initial vital signs.  Constitutional: NAD, well-nourished, appears stated age, no acute distress.  HEENT: Airway patent, moist MM, no erythema/swelling/deformity of oral structures. EOMI, PERRLA.  CV: regular rate, regular rhythm, well-perfused extremities, 2+ b/l DP and radial pulses equal.  Lungs: BCTA, no increased WOB.  ABD: Diffuse tenderness, no guarding or rebound, no pulsatile mass, no hernias.   MSK: Neck supple, nontender, nl ROM, no stepoff. Chest nontender. Back nontender in TLS spine or to b/l bony structures or flanks. Ext nontender, nl rom, no deformity.   INTEG: Skin warm, dry, no rash.  NEURO: A&Ox3, normal strength, nl sensation throughout, normal speech.   PSYCH: Calm, cooperative, normal affect and interaction.    Will get trauma scans (pan-scan given unreliable exam), labs, likely will require admission given inability of family to care for him at home.

## 2019-01-10 NOTE — ED PROVIDER NOTE - PHYSICAL EXAMINATION
Constitutional: Well developed, well nourished. NAD. Good general hygiene  Head: Atraumatic.  Eyes: EOMI without discomfort.   ENT: No nasal discharge. Mucous membranes moist.  Neck: Supple. Painless ROM.  Cardiovascular: Regular rhythm. Regular rate. Normal S1 and S2. No murmurs. 2+ pulses in all extremities.   Pulmonary: Normal respiratory rate and effort. Lungs clear to auscultation bilaterally. No wheezing, rales, or rhonchi. Bilateral, equal lung expansion. No chest wall pain.   Abdominal: Soft. Nondistended. Nontender. Generalized tenderness without rebound or guarding, R>L.   Extremities: Pelvis stable. No lower extremity edema. Symmetric calves.  Skin: No rashes.   Neuro: AAOx3. No focal neurological deficits.  Psych: Normal mood. Normal affect.

## 2019-01-10 NOTE — ED PROVIDER NOTE - MEDICAL DECISION MAKING DETAILS
Patient presented s/p fall this AM, hx frequent falls and worsening weakness and dyspnea on exertion x several weeks. Patient otherwise HD stable. Pan-scan shows single rib fx but otherwise no other acute traumatic injury. Patient also found to have worsening CKD, bilateral pleural effusions and worsening CHF. Will require admission for further monitoring and management. Family agreeable with this plan.

## 2019-01-10 NOTE — ED PROVIDER NOTE - SECONDARY DIAGNOSIS.
Lower extremity edema Weakness Closed fracture of one rib of left side, initial encounter Pulmonary nodules Bilateral pleural effusion

## 2019-01-10 NOTE — CONSULT NOTE ADULT - ASSESSMENT
89 yo M with multiple comorbidities, s/p fall from standing  - L 7th rib fracture  - severe CHF with possible exacerbation  - moderate b/l pl effusions  - MARILYN on CKD    Plan  Gaby has 1 mild rib fracture, does not require surgical intervention  Needs IS, pain control, extensive rehab  Medicine evaluation for admission for exacerbation of CHF, MARILYN on CKD and pl effusions  Will follow  Discussed with ED  Will notify Dr. Ty 89 yo M with multiple comorbidities, s/p fall from standing  - L 7th rib fracture  - severe CHF with possible exacerbation  - moderate b/l pl effusions  - MARILYN on CKD    Plan  Patient has 1 mild rib fracture, does not require surgical intervention  Needs IS, pain control, extensive rehab  Medicine evaluation for admission for exacerbation of CHF, MARILYN on CKD and pl effusions  Will follow  Discussed with ED  Will notify Dr. Ty

## 2019-01-10 NOTE — ED PROVIDER NOTE - OBJECTIVE STATEMENT
89y M w PMH bladder Ca, HTN, CKD w creatinine low 2s BIB family for evaluation after fall this morning. Pt was standing up with his walker and fell slowly backward, landing on his backside. Also had fall 2 days ago. Witnessed fall without LOC. No n/v/d. No recent illnesses. Family states that his legs have been getting more and more swollen and pt is complaining of generalized pain. No change in urinary frequency.

## 2019-01-10 NOTE — H&P ADULT - ATTENDING COMMENTS
Patient seen and examined independently. Agree with resident note with exceptions.     #Mechanical Fall with left 7th rib fracture, H/o recurrent falls  #Acute kidney injury on CKD stage 4  #Acute on chronic diastolic CHF with mod Mitral regurg, mod Pulmonay regurg, mild AS with B/l pleural effussions  #NSTEMI  #Pulmonary nodule  # progressively worsening Dementia  #H/o HTN  #H/o Dyslipidemia  #H/o Bladder cancer    Goals of care discussed with Family - they want him home with Hospice. Pt is DNR/ DNI, on comfort care.  Pt placed on comfort measures. Hospice consulted

## 2019-01-10 NOTE — H&P ADULT - ASSESSMENT
89 yo with PMHx of CHF, HTN, HLD, CKD  dementia, h/o bladder cancer and radiation on remission, presents s/p mechanical fall. In ED trauma work up was negative except for L 7th rib fracture which does not require surgical intervention.  Family is deciding for hospice evaluation and wants to keep patient comfortable with only medical management without any aggressive measures.    A/P  #Mechanical Fall with Left 7th Rib fracture  - seen by trauma team. No surgical intervention  - Pain management    #MARILYN on CKD  Prerenal ? Cardiorenal   - will give a trial of iv lasix   - trend BMP  - Urine lytes  - if Cr. worsens will consider renal consult    #R/o decompensated heart failure  - iv lasix  - strict I/O's monitor, daily weight  - 2d echo reviewed but no ?EF  - elevated troponemia: family does not want any further ischemic w/u    #Bilateral Pleural effusions/pulmonary nodule  - elevated pro BNP could be from ckd but cannot rule out  decompensated heart failure  - iv lasix  - Family does not want any intervention (no thoracentesis)  - Does not want any further w/u on pulmonary nodule    #HTN  hold losartan/torsemide  iv lasix for now    #CHG 4% bath daily and prn  DVT PPX:s/c lovenox  DISPO:from home  DIET:dash diet  Activity:bed rest for now  code status: DNR/DNI. GOC discussed with family at bedside. Family does not want any aggressive measures and is heading towards only comfort care with home hospice if possible.   Palliative/hospice consult placed  Overall Poor prognosis 89 yo with PMHx of CHF, HTN, HLD, CKD  dementia, h/o bladder cancer and radiation on remission, presents s/p mechanical fall. In ED trauma work up was negative except for L 7th rib fracture which does not require surgical intervention.  Family is deciding for hospice evaluation and wants to keep patient comfortable with only medical management without any aggressive measures.    A/P  #Mechanical Fall with Left 7th Rib fracture  - seen by trauma team. No surgical intervention  - Pain management    #MARILYN on CKD  Prerenal ? Cardiorenal   - will give a trial of iv lasix   - trend BMP  - Urine lytes  - if Cr. worsens will consider renal consult    #R/o decompensated heart failure  - iv lasix  - strict I/O's monitor, daily weight  - 2d echo reviewed but no ?EF  - elevated troponemia: family does not want any further ischemic w/u    #Troponemia/T wave inversion  family does not want any aggressive intervention: heading towards hospice  c/w medical mx : asa/lipitor/bb  losartan on hold for MARILYN    #Bilateral Pleural effusions/pulmonary nodule  - elevated pro BNP could be from ckd but cannot rule out  decompensated heart failure  - iv lasix  - Family does not want any intervention (no thoracentesis)  - Does not want any further w/u on pulmonary nodule    #HTN  hold losartan/torsemide  iv lasix for now    #CHG 4% bath daily and prn  DVT PPX:s/c heparin  DISPO:from home  DIET:dash diet  Activity:bed rest for now  code status: DNR/DNI. GOC discussed with family at bedside. Family does not want any aggressive measures and is heading towards only comfort care with home hospice if possible.   Palliative/hospice consult placed  Overall Poor prognosis 89 yo with PMHx of CHF, HTN, HLD, CKD  dementia, h/o bladder cancer and radiation on remission, presents s/p mechanical fall. In ED trauma work up was negative except for L 7th rib fracture which does not require surgical intervention.  Family is deciding for hospice evaluation and wants to keep patient comfortable with only medical management without any aggressive measures.    A/P  #Mechanical Fall with Left 7th Rib fracture  - Trauma w/u reviewed: negative except for 7th rib #  seen by trauma team. No surgical intervention  - Pain management  - incentive spirometry    #MARILYN on CKD  Prerenal ? Cardiorenal   - will give a trial of iv lasix   - trend BMP  - Urine lytes  - if Cr. worsens will consider renal consult and further investigations if family wants    #R/o decompensated heart failure  - iv lasix 40 mg q12h  - strict I/O's monitor, daily weight  - 2d echo  - elevated troponemia: family does not want any further ischemic w/u    #Troponemia/T wave inversion  family does not want any aggressive intervention: heading towards hospice  c/w medical mx : asa/lipitor/bb  losartan on hold for MARILYN  seen by cards: medical mx. 2d echo requested- ordered    #Bilateral Pleural effusions/pulmonary nodule  - elevated pro BNP could be from ckd but cannot rule out  decompensated heart failure  - iv lasix  - Family does not want any intervention (no thoracentesis)  - Does not want any further w/u on pulmonary nodule    #HTN  hold losartan/torsemide  iv lasix for now    #CHG 4% bath daily and prn  DVT PPX:s/c heparin  DISPO:from home  DIET:dash diet  Activity:bed rest for now  code status: DNR/DNI. GOC discussed with family at bedside. Family does not want any aggressive measures and is heading towards only comfort care with home hospice if possible.   Palliative/hospice consult placed  Overall Poor prognosis

## 2019-01-10 NOTE — ED ADULT NURSE NOTE - CHPI ED NUR SYMPTOMS NEG
no abrasion/no bleeding/no vomiting/no loss of consciousness/no deformity/no fever/no tingling/no numbness

## 2019-01-11 LAB
BASOPHILS # BLD AUTO: 0.05 K/UL — SIGNIFICANT CHANGE UP (ref 0–0.2)
BASOPHILS NFR BLD AUTO: 0.6 % — SIGNIFICANT CHANGE UP (ref 0–1)
EOSINOPHIL # BLD AUTO: 0.03 K/UL — SIGNIFICANT CHANGE UP (ref 0–0.7)
EOSINOPHIL NFR BLD AUTO: 0.4 % — SIGNIFICANT CHANGE UP (ref 0–8)
HCT VFR BLD CALC: 37.4 % — LOW (ref 42–52)
HGB BLD-MCNC: 12.2 G/DL — LOW (ref 14–18)
IMM GRANULOCYTES NFR BLD AUTO: 0.4 % — HIGH (ref 0.1–0.3)
LYMPHOCYTES # BLD AUTO: 1.16 K/UL — LOW (ref 1.2–3.4)
LYMPHOCYTES # BLD AUTO: 14.7 % — LOW (ref 20.5–51.1)
MCHC RBC-ENTMCNC: 32.2 PG — HIGH (ref 27–31)
MCHC RBC-ENTMCNC: 32.6 G/DL — SIGNIFICANT CHANGE UP (ref 32–37)
MCV RBC AUTO: 98.7 FL — HIGH (ref 80–94)
MONOCYTES # BLD AUTO: 0.59 K/UL — SIGNIFICANT CHANGE UP (ref 0.1–0.6)
MONOCYTES NFR BLD AUTO: 7.5 % — SIGNIFICANT CHANGE UP (ref 1.7–9.3)
NEUTROPHILS # BLD AUTO: 6.05 K/UL — SIGNIFICANT CHANGE UP (ref 1.4–6.5)
NEUTROPHILS NFR BLD AUTO: 76.4 % — HIGH (ref 42.2–75.2)
NRBC # BLD: 0 /100 WBCS — SIGNIFICANT CHANGE UP (ref 0–0)
PLATELET # BLD AUTO: 211 K/UL — SIGNIFICANT CHANGE UP (ref 130–400)
RBC # BLD: 3.79 M/UL — LOW (ref 4.7–6.1)
RBC # FLD: 15.2 % — HIGH (ref 11.5–14.5)
WBC # BLD: 7.91 K/UL — SIGNIFICANT CHANGE UP (ref 4.8–10.8)
WBC # FLD AUTO: 7.91 K/UL — SIGNIFICANT CHANGE UP (ref 4.8–10.8)

## 2019-01-11 RX ORDER — MORPHINE SULFATE 50 MG/1
0.5 CAPSULE, EXTENDED RELEASE ORAL
Qty: 0 | Refills: 0 | Status: DISCONTINUED | OUTPATIENT
Start: 2019-01-11 | End: 2019-01-11

## 2019-01-11 RX ORDER — FINASTERIDE 5 MG/1
5 TABLET, FILM COATED ORAL DAILY
Qty: 0 | Refills: 0 | Status: DISCONTINUED | OUTPATIENT
Start: 2019-01-11 | End: 2019-01-14

## 2019-01-11 RX ORDER — ATORVASTATIN CALCIUM 80 MG/1
40 TABLET, FILM COATED ORAL AT BEDTIME
Qty: 0 | Refills: 0 | Status: DISCONTINUED | OUTPATIENT
Start: 2019-01-11 | End: 2019-01-14

## 2019-01-11 RX ORDER — MORPHINE SULFATE 50 MG/1
2 CAPSULE, EXTENDED RELEASE ORAL ONCE
Qty: 0 | Refills: 0 | Status: DISCONTINUED | OUTPATIENT
Start: 2019-01-11 | End: 2019-01-11

## 2019-01-11 RX ORDER — MORPHINE SULFATE 50 MG/1
2 CAPSULE, EXTENDED RELEASE ORAL EVERY 4 HOURS
Qty: 0 | Refills: 0 | Status: DISCONTINUED | OUTPATIENT
Start: 2019-01-11 | End: 2019-01-11

## 2019-01-11 RX ORDER — METOPROLOL TARTRATE 50 MG
12.5 TABLET ORAL
Qty: 0 | Refills: 0 | Status: DISCONTINUED | OUTPATIENT
Start: 2019-01-11 | End: 2019-01-12

## 2019-01-11 RX ORDER — FUROSEMIDE 40 MG
40 TABLET ORAL DAILY
Qty: 0 | Refills: 0 | Status: DISCONTINUED | OUTPATIENT
Start: 2019-01-11 | End: 2019-01-13

## 2019-01-11 RX ORDER — DEXTROSE 50 % IN WATER 50 %
50 SYRINGE (ML) INTRAVENOUS ONCE
Qty: 0 | Refills: 0 | Status: COMPLETED | OUTPATIENT
Start: 2019-01-11 | End: 2019-01-11

## 2019-01-11 RX ORDER — MORPHINE SULFATE 50 MG/1
5 CAPSULE, EXTENDED RELEASE ORAL EVERY 8 HOURS
Qty: 0 | Refills: 0 | Status: DISCONTINUED | OUTPATIENT
Start: 2019-01-11 | End: 2019-01-12

## 2019-01-11 RX ORDER — INSULIN HUMAN 100 [IU]/ML
10 INJECTION, SOLUTION SUBCUTANEOUS ONCE
Qty: 0 | Refills: 0 | Status: COMPLETED | OUTPATIENT
Start: 2019-01-11 | End: 2019-01-11

## 2019-01-11 RX ADMIN — Medication 50 MILLILITER(S): at 00:46

## 2019-01-11 RX ADMIN — Medication 40 MILLIGRAM(S): at 06:04

## 2019-01-11 RX ADMIN — Medication 0.5 MILLIGRAM(S): at 23:51

## 2019-01-11 RX ADMIN — HEPARIN SODIUM 5000 UNIT(S): 5000 INJECTION INTRAVENOUS; SUBCUTANEOUS at 05:58

## 2019-01-11 RX ADMIN — Medication 12.5 MILLIGRAM(S): at 06:04

## 2019-01-11 RX ADMIN — MORPHINE SULFATE 2 MILLIGRAM(S): 50 CAPSULE, EXTENDED RELEASE ORAL at 00:46

## 2019-01-11 RX ADMIN — INSULIN HUMAN 10 UNIT(S): 100 INJECTION, SOLUTION SUBCUTANEOUS at 00:51

## 2019-01-11 NOTE — GOALS OF CARE CONVERSATION - PERSONAL ADVANCE DIRECTIVE - CONVERSATION DETAILS
Family called overnight - patient briefly doing better, and after family had a conversation, they decided that they want to resume limited medical interventions, including all daily medications that he was on; currently do not want lab draws. Have been counseled about patient's multiple comorbidities and poor prognosis even with occasional period of lucency, but for now say they want to have limited interventions and have a further conversation in the daytime tomorrow. Family called overnight - patient briefly doing better, and after family had a conversation, they decided that they want to resume limited medical interventions as per their best assessment of what the patient would want, including all daily medications that he was on; currently do not want lab draws. Have been counseled about patient's multiple comorbidities and poor prognosis even with occasional periods of lucency, but for now say they want to have limited interventions and have a further conversation in the daytime tomorrow.

## 2019-01-11 NOTE — GOALS OF CARE CONVERSATION - PERSONAL ADVANCE DIRECTIVE - CONVERSATION DETAILS
Diagnosis/prognosis/GOC discussed with family at bedside. Family does not want any aggressive measures : No Ischemic w/u for suspected NSTEMI/ no thoracentesis /no investigation for pulmonary nodule. Spouse understands the risks and benefits of no further intervention and wants only comfort measures at this moment. Hospice care has been consulted . Awaiting evaluation by hospice. Will continue providing comfort measures for pain/agitation and supportive measures.

## 2019-01-12 LAB
ANION GAP SERPL CALC-SCNC: 15 MMOL/L — HIGH (ref 7–14)
BUN SERPL-MCNC: 82 MG/DL — CRITICAL HIGH (ref 10–20)
CALCIUM SERPL-MCNC: 10.2 MG/DL — HIGH (ref 8.5–10.1)
CHLORIDE SERPL-SCNC: 107 MMOL/L — SIGNIFICANT CHANGE UP (ref 98–110)
CO2 SERPL-SCNC: 25 MMOL/L — SIGNIFICANT CHANGE UP (ref 17–32)
CREAT SERPL-MCNC: 3.8 MG/DL — HIGH (ref 0.7–1.5)
GLUCOSE SERPL-MCNC: 122 MG/DL — HIGH (ref 70–99)
HCT VFR BLD CALC: 37.3 % — LOW (ref 42–52)
HGB BLD-MCNC: 12.2 G/DL — LOW (ref 14–18)
MAGNESIUM SERPL-MCNC: 2.3 MG/DL — SIGNIFICANT CHANGE UP (ref 1.8–2.4)
MCHC RBC-ENTMCNC: 32.2 PG — HIGH (ref 27–31)
MCHC RBC-ENTMCNC: 32.7 G/DL — SIGNIFICANT CHANGE UP (ref 32–37)
MCV RBC AUTO: 98.4 FL — HIGH (ref 80–94)
NRBC # BLD: 0 /100 WBCS — SIGNIFICANT CHANGE UP (ref 0–0)
PLATELET # BLD AUTO: 214 K/UL — SIGNIFICANT CHANGE UP (ref 130–400)
POTASSIUM SERPL-MCNC: 4.9 MMOL/L — SIGNIFICANT CHANGE UP (ref 3.5–5)
POTASSIUM SERPL-SCNC: 4.9 MMOL/L — SIGNIFICANT CHANGE UP (ref 3.5–5)
RBC # BLD: 3.79 M/UL — LOW (ref 4.7–6.1)
RBC # FLD: 15.2 % — HIGH (ref 11.5–14.5)
SODIUM SERPL-SCNC: 147 MMOL/L — HIGH (ref 135–146)
WBC # BLD: 7.87 K/UL — SIGNIFICANT CHANGE UP (ref 4.8–10.8)
WBC # FLD AUTO: 7.87 K/UL — SIGNIFICANT CHANGE UP (ref 4.8–10.8)

## 2019-01-12 PROCEDURE — 93970 EXTREMITY STUDY: CPT | Mod: 26

## 2019-01-12 RX ORDER — ASPIRIN/CALCIUM CARB/MAGNESIUM 324 MG
81 TABLET ORAL DAILY
Qty: 0 | Refills: 0 | Status: DISCONTINUED | OUTPATIENT
Start: 2019-01-12 | End: 2019-01-14

## 2019-01-12 RX ORDER — METOPROLOL TARTRATE 50 MG
25 TABLET ORAL
Qty: 0 | Refills: 0 | Status: DISCONTINUED | OUTPATIENT
Start: 2019-01-12 | End: 2019-01-14

## 2019-01-12 RX ORDER — HEPARIN SODIUM 5000 [USP'U]/ML
5000 INJECTION INTRAVENOUS; SUBCUTANEOUS EVERY 8 HOURS
Qty: 0 | Refills: 0 | Status: DISCONTINUED | OUTPATIENT
Start: 2019-01-12 | End: 2019-01-13

## 2019-01-12 RX ORDER — MORPHINE SULFATE 50 MG/1
2 CAPSULE, EXTENDED RELEASE ORAL EVERY 6 HOURS
Qty: 0 | Refills: 0 | Status: DISCONTINUED | OUTPATIENT
Start: 2019-01-12 | End: 2019-01-13

## 2019-01-12 RX ADMIN — Medication 12.5 MILLIGRAM(S): at 05:24

## 2019-01-12 RX ADMIN — HEPARIN SODIUM 5000 UNIT(S): 5000 INJECTION INTRAVENOUS; SUBCUTANEOUS at 14:29

## 2019-01-12 RX ADMIN — FINASTERIDE 5 MILLIGRAM(S): 5 TABLET, FILM COATED ORAL at 11:56

## 2019-01-12 RX ADMIN — Medication 81 MILLIGRAM(S): at 11:56

## 2019-01-12 RX ADMIN — Medication 25 MILLIGRAM(S): at 17:38

## 2019-01-12 RX ADMIN — Medication 0.5 MILLIGRAM(S): at 19:50

## 2019-01-12 RX ADMIN — Medication 40 MILLIGRAM(S): at 05:23

## 2019-01-12 RX ADMIN — ATORVASTATIN CALCIUM 40 MILLIGRAM(S): 80 TABLET, FILM COATED ORAL at 21:32

## 2019-01-12 RX ADMIN — HEPARIN SODIUM 5000 UNIT(S): 5000 INJECTION INTRAVENOUS; SUBCUTANEOUS at 21:32

## 2019-01-12 RX ADMIN — MORPHINE SULFATE 2 MILLIGRAM(S): 50 CAPSULE, EXTENDED RELEASE ORAL at 23:19

## 2019-01-12 NOTE — PROGRESS NOTE ADULT - SUBJECTIVE AND OBJECTIVE BOX
89 yo with PMHx of CHF, HTN, HLD, CKD  dementia, h/o bladder cancer and radiation on remission, presents s/p mechanical fall. As per history obtained from daughter at bedside, Pt. was trying to get his walker when he fell off balance and fell on his back , but did not hit his head on floor , no LOC. Denies any warning symptoms s/a feeling dizzy or lightheaded/diaphoresis/palpitations/chest pain. States having swelling in left lower extremity and chronic LE swelling, intermittent sob ( on lasix prn)  . As per wife patient has been progressively declining in his functional status lately and has been more confused /disoriented lately .Denies fever/chills/n/v/abd. pain/urinary symptoms/focal weaknesses.  At baseline, he walks with walker, and his daughter/wife helps with daily activities.   In ED trauma work up was negative except for L 7th rib fracture which does not require surgical intervention. (10 Caesar 2019 17:46)    Currently admitted to medicine with as a Trauma Code  Hospital course has been complicated by tropinemia.   pt has no pain, only complaining of discomfort because of escudero, wife is requesting escudero removal.     No further invasive measures - patient comfort only.    Vital Signs Last 24 Hrs  T(C): 36.4 (12 Jan 2019 07:00), Max: 36.9 (12 Jan 2019 00:01)  T(F): 97.5 (12 Jan 2019 07:00), Max: 98.4 (12 Jan 2019 00:01)  HR: 93 (12 Jan 2019 07:00) (73 - 93)  BP: 165/- (12 Jan 2019 07:00) (165/- - 181/77)  BP(mean): 114 (12 Jan 2019 07:00) (110 - 116)  RR: 22 (12 Jan 2019 07:00) (18 - 22)  SpO2: --    Physical exam:   constitutional NAD, AA, Respiratory  lungs CTA, CVS heart RRR, GI: abdomen Soft NT, ND, BS+, skin: intact  neuro exam non focal.                          12.2   7.87  )-----------( 214      ( 12 Jan 2019 12:39 )             37.3     01-12    147<H>  |  107  |  82<HH>  ----------------------------<  122<H>  4.9   |  25  |  3.8<H>    Ca    10.2<H>      12 Jan 2019 12:39  Mg     2.3     01-12      a/p  #Mechanical Fall with Left 7th Rib fracture  #MARILYN on CKD likely 2/2 pre-renal vs CHF  bmp trend:   3.8  3.7  3.8	  #R/o Decomensated HF // Bilat Pleural Effusions // Troponemia/T wave inversion  #Pulmonary Nodule,  #HTN cont meds  #hx bladder ca  code status: DNR/DNI. comfort measures only.   as per family they still want blood work   dc escudero , voiding trial

## 2019-01-12 NOTE — GOALS OF CARE CONVERSATION - PERSONAL ADVANCE DIRECTIVE - CONVERSATION DETAILS
- Initially DNR/DNI/CMO, wanted hospice  - patient family not does not want to pursue hospice, CMO  - Want to continue DNR/DNI but revises their wishes for CMO      - want Medications, blood work, continue feeding, continue with ativan for agitated  - Do not want resuscitation or intubation at this time

## 2019-01-13 LAB
ANION GAP SERPL CALC-SCNC: 19 MMOL/L — HIGH (ref 7–14)
BUN SERPL-MCNC: 85 MG/DL — CRITICAL HIGH (ref 10–20)
CALCIUM SERPL-MCNC: 10 MG/DL — SIGNIFICANT CHANGE UP (ref 8.5–10.1)
CHLORIDE SERPL-SCNC: 108 MMOL/L — SIGNIFICANT CHANGE UP (ref 98–110)
CO2 SERPL-SCNC: 21 MMOL/L — SIGNIFICANT CHANGE UP (ref 17–32)
CREAT SERPL-MCNC: 4.2 MG/DL — CRITICAL HIGH (ref 0.7–1.5)
GLUCOSE SERPL-MCNC: 125 MG/DL — HIGH (ref 70–99)
HCT VFR BLD CALC: 37.1 % — LOW (ref 42–52)
HGB BLD-MCNC: 12 G/DL — LOW (ref 14–18)
MAGNESIUM SERPL-MCNC: 2.5 MG/DL — HIGH (ref 1.8–2.4)
MCHC RBC-ENTMCNC: 32 PG — HIGH (ref 27–31)
MCHC RBC-ENTMCNC: 32.3 G/DL — SIGNIFICANT CHANGE UP (ref 32–37)
MCV RBC AUTO: 98.9 FL — HIGH (ref 80–94)
NRBC # BLD: 0 /100 WBCS — SIGNIFICANT CHANGE UP (ref 0–0)
PLATELET # BLD AUTO: 184 K/UL — SIGNIFICANT CHANGE UP (ref 130–400)
POTASSIUM SERPL-MCNC: 5.6 MMOL/L — HIGH (ref 3.5–5)
POTASSIUM SERPL-SCNC: 5.6 MMOL/L — HIGH (ref 3.5–5)
RBC # BLD: 3.75 M/UL — LOW (ref 4.7–6.1)
RBC # FLD: 15.6 % — HIGH (ref 11.5–14.5)
SODIUM SERPL-SCNC: 148 MMOL/L — HIGH (ref 135–146)
WBC # BLD: 8.2 K/UL — SIGNIFICANT CHANGE UP (ref 4.8–10.8)
WBC # FLD AUTO: 8.2 K/UL — SIGNIFICANT CHANGE UP (ref 4.8–10.8)

## 2019-01-13 RX ORDER — MORPHINE SULFATE 50 MG/1
2 CAPSULE, EXTENDED RELEASE ORAL EVERY 6 HOURS
Qty: 0 | Refills: 0 | Status: DISCONTINUED | OUTPATIENT
Start: 2019-01-13 | End: 2019-01-13

## 2019-01-13 RX ORDER — INSULIN HUMAN 100 [IU]/ML
10 INJECTION, SOLUTION SUBCUTANEOUS ONCE
Qty: 0 | Refills: 0 | Status: COMPLETED | OUTPATIENT
Start: 2019-01-13 | End: 2019-01-13

## 2019-01-13 RX ORDER — DEXTROSE 50 % IN WATER 50 %
50 SYRINGE (ML) INTRAVENOUS ONCE
Qty: 0 | Refills: 0 | Status: DISCONTINUED | OUTPATIENT
Start: 2019-01-13 | End: 2019-01-13

## 2019-01-13 RX ORDER — MORPHINE SULFATE 50 MG/1
2 CAPSULE, EXTENDED RELEASE ORAL ONCE
Qty: 0 | Refills: 0 | Status: DISCONTINUED | OUTPATIENT
Start: 2019-01-13 | End: 2019-01-13

## 2019-01-13 RX ORDER — MORPHINE SULFATE 50 MG/1
2 CAPSULE, EXTENDED RELEASE ORAL EVERY 4 HOURS
Qty: 0 | Refills: 0 | Status: DISCONTINUED | OUTPATIENT
Start: 2019-01-13 | End: 2019-01-13

## 2019-01-13 RX ORDER — MORPHINE SULFATE 50 MG/1
2 CAPSULE, EXTENDED RELEASE ORAL EVERY 4 HOURS
Qty: 0 | Refills: 0 | Status: DISCONTINUED | OUTPATIENT
Start: 2019-01-13 | End: 2019-01-14

## 2019-01-13 RX ADMIN — Medication 25 MILLIGRAM(S): at 06:04

## 2019-01-13 RX ADMIN — HEPARIN SODIUM 5000 UNIT(S): 5000 INJECTION INTRAVENOUS; SUBCUTANEOUS at 06:04

## 2019-01-13 RX ADMIN — MORPHINE SULFATE 2 MILLIGRAM(S): 50 CAPSULE, EXTENDED RELEASE ORAL at 18:27

## 2019-01-13 RX ADMIN — MORPHINE SULFATE 2 MILLIGRAM(S): 50 CAPSULE, EXTENDED RELEASE ORAL at 06:04

## 2019-01-13 RX ADMIN — MORPHINE SULFATE 2 MILLIGRAM(S): 50 CAPSULE, EXTENDED RELEASE ORAL at 00:18

## 2019-01-13 RX ADMIN — Medication 0.5 MILLIGRAM(S): at 21:26

## 2019-01-13 RX ADMIN — Medication 40 MILLIGRAM(S): at 06:04

## 2019-01-13 RX ADMIN — MORPHINE SULFATE 2 MILLIGRAM(S): 50 CAPSULE, EXTENDED RELEASE ORAL at 21:30

## 2019-01-13 NOTE — PROGRESS NOTE ADULT - ASSESSMENT
ADMISSION SUMMARY  89 yo with PMHx of CHF, HTN, HLD, CKD  dementia, h/o bladder cancer and radiation on remission, presents s/p mechanical fall. In ED trauma work up was negative except for L 7th rib fracture which does not require surgical intervention.  Family is deciding for hospice evaluation and wants to keep patient comfortable with only medical management without any aggressive measures.    ASSESSMENT & PLAN    # Mechanical Fall with Left 7th Rib fracture  - Trauma w/u reviewed: negative except for 7th rib, no further intervention  - C/w pain management - morphine 2 mg IV q6h PRN - can increase if needed  - Comfort measures only    # Worsening MARILYN on CKD likely 2/2 pre-renal vs. CHF  - Lungs clear today, hold IV lasix for now given worsening MARILYN  - Echo order discontinued, comfort measures only, no more invasive measures  - Hold losartan, Andre discontinued yesterday  - If worsening respiratory status, may resume IV Lasix    # Hyperkalemia  - No more blood draws, pt may not able take po medication if were to give Kayexalate, conservative management, comfort care, will hold off further treatment for now    # Decompensated heart failure, Bilateral Pleural Effusions, Troponemia, T wave inversion  - Family headed towards home hospice, refused ischemic work-up, hold Echo, no further w/u  - Hold losartan, c/w asa, lipitor and beta-blocker if pt is awake enough to take pills  - Oxygen supplementation for comfort  - If worsening respiratory status, may resume IV Lasix    # Pulmonary Nodule  - Family does not want any intervention (no thoracentesis), no workup at this time as per family    # HTN  - IV lasix PRN for pt comfort only     # Disposition  - Overall poor prognosis, patient is actively dying. Family understands, at bedside. Hospice consult ?   - Code status: DNR/DNI, comfort measures only, no blood draws, discontinue repeating labs

## 2019-01-13 NOTE — PROGRESS NOTE ADULT - ASSESSMENT
89 yo with PMHx of CHF, HTN, HLD, CKD  dementia, h/o bladder cancer and radiation on remission, presents s/p mechanical fall. In ED trauma work up was negative except for L 7th rib fracture which does not require surgical intervention.  Family is deciding for hospice evaluation and wants to keep patient comfortable with only medical management without any aggressive measures.    # Mechanical Fall with Left 7th Rib fracture  - Trauma w/u reviewed: negative except for 7th rib, no further intervention  - c/w pain management  - comfort measures only    # Worsening MARILYN on CKD likely 2/2 pre-renal vs. CHF  - lungs clear today, will hold IV lasix for now given worsening MARILYN  - Echo order discontinues, comfort measures only, no more invasive measures  - hold losartan  - escudero discontinued    # Decompensated heart failure, Bilateral Pleural Effusions, Troponemia, T wave inversion  - family headed towards home hospice, refused ischemic work-up  - hold losartan, c/w asa, lipitor and beta-blocker if pt is awake enougth to take pills  - oxygen supplementation prn  - holding echo, no further work-up     # Pulmonary Nodule  - Family does not want any intervention (no thoracentesis), no workup at this time as per family    # HTN  - IV lasix for pt comfort only     # Dementia  - not on active medications    # Code status, pt is actively dying  - DNR/DNI, comfort measures only, no blood draws, discontinue repeating labs  - overall Poor prognosis 89 yo with PMHx of CHF, HTN, HLD, CKD  dementia, h/o bladder cancer and radiation on remission, presents s/p mechanical fall. In ED trauma work up was negative except for L 7th rib fracture which does not require surgical intervention.  Family is deciding for hospice evaluation and wants to keep patient comfortable with only medical management without any aggressive measures.    # Mechanical Fall with Left 7th Rib fracture  - Trauma w/u reviewed: negative except for 7th rib, no further intervention  - c/w pain management  - comfort measures only    # Worsening MARILYN on CKD likely 2/2 pre-renal vs. CHF  - lungs clear today, will hold IV lasix for now given worsening MARILYN  - Echo order discontinues, comfort measures only, no more invasive measures  - hold losartan  - escudero discontinued yesterday    # Hyperkalemia  - no more blood draws, pt may not able take po medication if were to give Kayexalate, conservative management, comfort care, will hold off further treatment for now    # Decompensated heart failure, Bilateral Pleural Effusions, Troponemia, T wave inversion  - family headed towards home hospice, refused ischemic work-up  - hold losartan, c/w asa, lipitor and beta-blocker if pt is awake enough to take pills  - oxygen supplementation   - holding echo, no further work-up   - if worsening respiratory status, may resume IV lasix    # Pulmonary Nodule  - Family does not want any intervention (no thoracentesis), no workup at this time as per family    # HTN  - IV lasix for pt comfort only     # Dementia  - not on active medications    # Code status, pt is actively dying  - DNR/DNI, comfort measures only, no blood draws, discontinue repeating labs  - overall Poor prognosis, family provided understanding, multiple family members present at bedside

## 2019-01-13 NOTE — PROGRESS NOTE ADULT - SUBJECTIVE AND OBJECTIVE BOX
LEO SLADE 89y Male  MRN#: [id number]     SUBJECTIVE  Patient is a 89y old Male who presents with a chief complaint of Mechanical fall/weakness (13 Jan 2019 16:37)  Currently admitted to medicine with the primary diagnosis of MARILYN (acute kidney injury)    Today is hospital day 3d, and this morning he is unresponsive to verbal stimuli. Appears in NAD. No overnight events.     OBJECTIVE  PAST MEDICAL & SURGICAL HISTORY  Chronic kidney disease, unspecified CKD stage  Bladder cancer  HTN (hypertension)  Bladder polyps    ALLERGIES:  No Known Allergies    MEDICATIONS:  STANDING MEDICATIONS  aspirin  chewable 81 milliGRAM(s) Oral daily  atorvastatin 40 milliGRAM(s) Oral at bedtime  finasteride 5 milliGRAM(s) Oral daily  heparin  Injectable 5000 Unit(s) SubCutaneous every 8 hours  metoprolol tartrate 25 milliGRAM(s) Oral two times a day    PRN MEDICATIONS  acetaminophen   Tablet .. 650 milliGRAM(s) Oral every 4 hours PRN  LORazepam     Tablet 0.5 milliGRAM(s) Oral every 6 hours PRN  morphine  - Injectable 2 milliGRAM(s) IV Push every 6 hours PRN      VITAL SIGNS: Last 24 Hours  T(C): 36.5 (13 Jan 2019 17:38), Max: 36.5 (13 Jan 2019 07:05)  T(F): 97.7 (13 Jan 2019 17:38), Max: 97.7 (13 Jan 2019 07:05)  HR: 77 (13 Jan 2019 17:38) (75 - 77)  BP: 163/76 (13 Jan 2019 17:38) (108/58 - 163/76)  BP(mean): 79 (13 Jan 2019 07:05) (79 - 99)  RR: 22 (13 Jan 2019 07:05) (20 - 22)  SpO2: --    LABS:                        12.0   8.20  )-----------( 184      ( 13 Jan 2019 06:37 )             37.1     01-13    148<H>  |  108  |  85<HH>  ----------------------------<  125<H>  5.6<H>   |  21  |  4.2<HH>    Ca    10.0      13 Jan 2019 06:37  Mg     2.5     01-13                    RADIOLOGY:      PHYSICAL EXAM:  GENERAL: NAD, thin, unresponsive to verbal stimuli	  HEENT: Atraumatic, Normocephalic  PULMONARY: Bilateral air entry, no wheezing or crackles  CARDIOVASCULAR: Regular rate and rhythm; No murmurs appreciated  GASTROINTESTINAL: Soft, Nondistended; Bowel sounds present  EXTREMITIES:  Trace bilateral LE edema  NEUROLOGY: Not following commands

## 2019-01-13 NOTE — PROGRESS NOTE ADULT - SUBJECTIVE AND OBJECTIVE BOX
HPI:  89 yo with PMHx of CHF, HTN, HLD, CKD  dementia, h/o bladder cancer and radiation on remission, presents s/p mechanical fall. As per history obtained from daughter at bedside, Pt. was trying to get his walker when he fell off balance and fell on his back , but did not hit his head on floor , no LOC. Denies any warning symptoms s/a feeling dizzy or lightheaded/diaphoresis/palpitations/chest pain. States having swelling in left lower extremity and chronic LE swelling, intermittent sob ( on lasix prn)  . As per wife patient has been progressively declining in his functional status lately and has been more confused /disoriented lately .Denies fever/chills/n/v/abd. pain/urinary symptoms/focal weaknesses.  At baseline, he walks with walker, and his daughter/wife helps with daily activities.   In ED trauma work up was negative except for L 7th rib fracture which does not require surgical intervention. (10 Caesar 2019 17:46)      ROS: Pt unable to provide ROS. Unresponsive to commands. Only responds to painful stimuli.    Overnight events: No acute events overnight.    Vital Signs Last 24 Hrs  T(C): 36.5 (13 Jan 2019 07:05), Max: 36.5 (13 Jan 2019 07:05)  T(F): 97.7 (13 Jan 2019 07:05), Max: 97.7 (13 Jan 2019 07:05)  HR: 75 (13 Jan 2019 07:05) (75 - 75)  BP: 108/58 (13 Jan 2019 07:05) (108/58 - 155/69)  BP(mean): 79 (13 Jan 2019 07:05) (79 - 99)  RR: 22 (13 Jan 2019 07:05) (20 - 22)      LABS:                        12.0   8.20  )-----------( 184      ( 13 Jan 2019 06:37 )             37.1     01-13    148<H>  |  108  |  85<HH>  ----------------------------<  125<H>  5.6<H>   |  21  |  4.2<HH>    Ca    10.0      13 Jan 2019 06:37  Mg     2.5     01-13      MEDICATIONS  (STANDING):  aspirin  chewable 81 milliGRAM(s) Oral daily  atorvastatin 40 milliGRAM(s) Oral at bedtime  finasteride 5 milliGRAM(s) Oral daily  furosemide   Injectable 40 milliGRAM(s) IV Push daily  heparin  Injectable 5000 Unit(s) SubCutaneous every 8 hours  metoprolol tartrate 25 milliGRAM(s) Oral two times a day    MEDICATIONS  (PRN):  acetaminophen   Tablet .. 650 milliGRAM(s) Oral every 4 hours PRN Moderate Pain (4 - 6)  LORazepam     Tablet 0.5 milliGRAM(s) Oral every 6 hours PRN Agitation  morphine  - Injectable 2 milliGRAM(s) IV Push every 6 hours PRN Severe Pain (7 - 10)      PHYSICAL EXAM:  CONSTITUTIONAL: NAD  HEAD:  Atraumatic, Normocephalic.  EYES: Conjunctiva and sclera clear.  NERVOUS SYSTEM:  Unresponsive, not following commands.  RESPIRATORY: Bilateral air entry; No rales, rhonchi, wheezing, or rubs.  CARDIOVASCULAR: Regular rate and rhythm; No murmurs, rubs, or gallops.  GASTROINTESTINAL: Soft, Nontender, Nondistended;   MUSCULOSKELETAL: No joint swelling or tenderness.  EXTREMITIES: No clubbing, cyanosis. Mild LE swelling.  SKIN: No rashes or lesions.

## 2019-01-14 VITALS
RESPIRATION RATE: 18 BRPM | TEMPERATURE: 100 F | HEART RATE: 83 BPM | SYSTOLIC BLOOD PRESSURE: 93 MMHG | DIASTOLIC BLOOD PRESSURE: 52 MMHG

## 2019-01-14 RX ORDER — MORPHINE SULFATE 50 MG/1
2 CAPSULE, EXTENDED RELEASE ORAL EVERY 4 HOURS
Qty: 0 | Refills: 0 | Status: DISCONTINUED | OUTPATIENT
Start: 2019-01-14 | End: 2019-01-14

## 2019-01-14 RX ADMIN — MORPHINE SULFATE 2 MILLIGRAM(S): 50 CAPSULE, EXTENDED RELEASE ORAL at 09:25

## 2019-01-14 RX ADMIN — Medication 0.5 MILLIGRAM(S): at 04:15

## 2019-01-14 RX ADMIN — MORPHINE SULFATE 2 MILLIGRAM(S): 50 CAPSULE, EXTENDED RELEASE ORAL at 01:21

## 2019-01-14 RX ADMIN — Medication 0.5 MILLIGRAM(S): at 10:55

## 2019-01-14 NOTE — CHART NOTE - NSCHARTNOTEFT_GEN_A_CORE
Palliative Care Biopsychosocial Assessment:     89 yo with PMHx of CHF, HTN, HLD, CKD  dementia, h/o bladder cancer and radiation on remission, presents s/p mechanical fall. As per history obtained from daughter at bedside, Pt. was trying to get his walker when he fell off balance and fell on his back , but did not hit his head on floor , no LOC. Denies any warning symptoms s/a feeling dizzy or lightheaded/diaphoresis/palpitations/chest pain. States having swelling in left lower extremity and chronic LE swelling, intermittent sob ( on lasix prn)  . As per wife patient has been progressively declining in his functional status lately and has been more confused /disoriented lately .Denies fever/chills/n/v/abd. pain/urinary symptoms/focal weaknesses.  At baseline, he walks with walker, and his daughter/wife helps with daily activities.    In ED trauma work up was negative except for L 7th rib fracture which does not require surgical intervention.    Pt now DNR/DNI, comfort measures only, un-responsive, comfortable.   SW spoke with spouse at bedside and dtr via telephone, explained Palliative Care, family in agreement with comfort measures only.  Palliative team will make recommendations for comfort.              Patient Coping Status:       [   ]   coping well        [   ]    coping with  some difficulty       [   ]   difficulty coping     [ x  ]   other                                                       Patient Emotional Status:     [   ]   anxious         [   ]   depressed           [   ]  overwhelmed          [   ]   angry         [   ]accepting       [   ]   not accepting           [ x  ]   other     Patient Mental Status:      [   ]   alert              [   ]   oriented         [    ]   confused         [   ] lethargic         [ x  ]   non-responsive   [   ]   other     Advance Directives:     [   ]    Health Care Surrogate: Name:                             [   ]    Health Care Proxy: Name:   [ x  ]    MOLST  [   ]    Living Will  [x   ]    DNR  [x   ]    DNI    Patient Needs:     [   ]   Supportive Counseling                  [   ]   Family Meeting            [   ]    Education                           [   ]   Advance Care Planning         Caregiver Name:   Caregiver needs:     [ x  ]   Supportive Counseling      [   ]   Family Conference      [   ]   Education    [   ]   Other     Referral:      [   ]   Community Resources         [   ]   Cancer Support Group     [   ]    Hospice       [   ]   Bereavement support     [   ]   Pastoral Care      [   ]  [facility] [place]       [   ]  Child Life Services     [   ]   Other                    Spectra #: [phone]

## 2019-01-14 NOTE — PROGRESS NOTE ADULT - REASON FOR ADMISSION
Mechanical fall/weakness

## 2019-01-14 NOTE — GOALS OF CARE CONVERSATION - PERSONAL ADVANCE DIRECTIVE - NS PRO AD PATIENT TYPE
Do Not Resuscitate (DNR)/Health Care Proxy (HCP)/Medical Orders for Life-Sustaining Treatment (MOLST)
Do Not Resuscitate (DNR)/Medical Orders for Life-Sustaining Treatment (MOLST)/Health Care Proxy (HCP)
Health Care Proxy (HCP)/Medical Orders for Life-Sustaining Treatment (MOLST)/Do Not Resuscitate (DNR)
Medical Orders for Life-Sustaining Treatment (MOLST)/Do Not Resuscitate (DNR)/Health Care Proxy (HCP)

## 2019-01-14 NOTE — PROGRESS NOTE ADULT - ASSESSMENT
89 yo with PMHx of CHF, HTN, HLD, CKD  dementia, h/o bladder cancer and radiation on remission, presents s/p mechanical fall. In ED trauma work up was negative except for L 7th rib fracture which does not require surgical intervention.    Mechanical Fall with Left 7th Rib fracture  - no surgical intervention per trauma team  - c/w Pain management      MARILYN on CKD likely 2/2 pre-renal vs CHF  -Last Scr 1/13 : 4.3  - NO FURTHER BLOOD DRAWS, no more invasive measures      R/O Decompensated HF // Bilat Pleural Effusions // Troponemia/T wave inversion  - family headed towards home hospice, refused ischemic work-up  - hold losartan, c/w asa/lipitor/BB  - Cardio Consult appreciated - only medical management  - holding echo, no further work-up       Pulmonary Nodule  - Family does not want any intervention at this time     HTN  - c/w metoprolol     DVT PPX :s/c heparin: discontinued  - DISPO:from home  - code status: DNR/DNI.   - Overall Poor prognosis

## 2019-01-14 NOTE — CONSULT NOTE ADULT - SUBJECTIVE AND OBJECTIVE BOX
REQUESTED OF: DR Bonilla    CLINICAL ISSUE TO BE EVALUATED BY CONSULTANT: Comfort care        LEO SLADE 89yMale  HPI:  89 yo with PMHx of CHF, HTN, HLD, CKD  dementia, h/o bladder cancer and radiation on remission, presents s/p mechanical fall. As per history obtained from daughter at bedside, Pt. was trying to get his walker when he fell off balance and fell on his back , but did not hit his head on floor , no LOC. Denies any warning symptoms s/a feeling dizzy or lightheaded/diaphoresis/palpitations/chest pain. States having swelling in left lower extremity and chronic LE swelling, intermittent sob ( on lasix prn)  . As per wife patient has been progressively declining in his functional status lately and has been more confused /disoriented lately .Denies fever/chills/n/v/abd. pain/urinary symptoms/focal weaknesses.  At baseline, he walks with walker, and his daughter/wife helps with daily activities.   In ED trauma work up was negative except for L 7th rib fracture which does not require surgical intervention. (10 Caesar 2019 17:46)      Pt seen in vent unit. Unresponsive, chaynne stoke breathing, intermittently agitated per nursing staff. No noted pain. No po intake.       PAST MEDICAL & SURGICAL HISTORY:  Chronic kidney disease, unspecified CKD stage  Bladder cancer  HTN (hypertension)  Bladder polyps        PHYSICAL EXAM:  Elderly man, unresponsive, in end stages of life  PERR  RRR  ABdom soft  No edema        T(C): 37, Max: 37 (08:00)  HR: 82 (65 - 82)  BP: 141/64 (141/64 - 163/76)  RR: 20 (18 - 20)  SpO2: --      LABS/STUDIES:  01-13    148<H>  |  108  |  85<HH>  ----------------------------<  125<H>  5.6<H>   |  21  |  4.2<HH>    Ca    10.0      13 Jan 2019 06:37  Mg     2.5     01-13                              12.0   8.20  )-----------( 184      ( 13 Jan 2019 06:37 )             37.1       MEDICATIONS  (STANDING):  aspirin  chewable 81 milliGRAM(s) Oral daily  atorvastatin 40 milliGRAM(s) Oral at bedtime  finasteride 5 milliGRAM(s) Oral daily  metoprolol tartrate 25 milliGRAM(s) Oral two times a day  morphine  - Injectable 2 milliGRAM(s) IV Push every 4 hours    MEDICATIONS  (PRN):  acetaminophen   Tablet .. 650 milliGRAM(s) Oral every 4 hours PRN Moderate Pain (4 - 6)  LORazepam   Injectable 1 milliGRAM(s) IV Push every 6 hours PRN Agitation  morphine  - Injectable 2 milliGRAM(s) IV Push every 4 hours PRN Severe Pain (7 - 10)        F[place] Symptom Assesment Scale      PPS (Palliative Performance Scale): 10

## 2019-01-14 NOTE — GOALS OF CARE CONVERSATION - PERSONAL ADVANCE DIRECTIVE - WHAT MATTERS MOST
Family does not want patient to suffer.
Patient comfort balanced with return to patient functional status
Patient's comfort, limited medical intervention.
Patient being comfortable and wants to have home hospice if possible

## 2019-01-14 NOTE — PROGRESS NOTE ADULT - SUBJECTIVE AND OBJECTIVE BOX
Patient is a 89y old  Male who presents with a chief complaint of Mechanical fall/weakness (14 Jan 2019 13:55)    Interval events: none    Today: Patient is not speaking, has not eaten in 3 days as per wife. Comfort care only, DNI/DNR    PAST MEDICAL & SURGICAL HISTORY:  Chronic kidney disease, unspecified CKD stage  Bladder cancer  HTN (hypertension)  Bladder polyps      MEDICATIONS  (STANDING):  aspirin  chewable 81 milliGRAM(s) Oral daily  atorvastatin 40 milliGRAM(s) Oral at bedtime  finasteride 5 milliGRAM(s) Oral daily  metoprolol tartrate 25 milliGRAM(s) Oral two times a day  morphine  - Injectable 2 milliGRAM(s) IV Push every 4 hours    MEDICATIONS  (PRN):  acetaminophen   Tablet .. 650 milliGRAM(s) Oral every 4 hours PRN Moderate Pain (4 - 6)  LORazepam   Injectable 1 milliGRAM(s) IV Push every 6 hours PRN Agitation  morphine  - Injectable 2 milliGRAM(s) IV Push every 4 hours PRN Severe Pain (7 - 10)          Vital Signs Last 24 Hrs  T(C): 37.7 (14 Jan 2019 15:53), Max: 37.7 (14 Jan 2019 15:53)  T(F): 99.8 (14 Jan 2019 15:53), Max: 99.8 (14 Jan 2019 15:53)  HR: 83 (14 Jan 2019 15:53) (65 - 83)  BP: 93/52 (14 Jan 2019 15:53) (93/52 - 160/76)  BP(mean): 70 (14 Jan 2019 15:53) (70 - 92)  RR: 18 (14 Jan 2019 15:53) (18 - 20)  SpO2: --  CAPILLARY BLOOD GLUCOSE        I&O's Summary    13 Jan 2019 07:01  -  14 Jan 2019 07:00  --------------------------------------------------------  IN: 0 mL / OUT: 2 mL / NET: -2 mL    14 Jan 2019 07:01  -  14 Jan 2019 18:54  --------------------------------------------------------  IN: 0 mL / OUT: 1 mL / NET: -1 mL        Physical Exam:    -     General : laying in bed, not speaking Cachectic and malnourised    -      Cardiac: RRR    -      Pulm: CTABL    -      GI: soft, ND    -      Musculoskeletal: Atramatic, no edema, no cyanosis    -      Neuro: Ao x 0        Labs:                        12.0   8.20  )-----------( 184      ( 13 Jan 2019 06:37 )             37.1             01-13    148<H>  |  108  |  85<HH>  ----------------------------<  125<H>  5.6<H>   |  21  |  4.2<HH>    Ca    10.0      13 Jan 2019 06:37  Mg     2.5     01-13                            Imaging:    ECG:

## 2019-01-14 NOTE — GOALS OF CARE CONVERSATION - PERSONAL ADVANCE DIRECTIVE - CONVERSATION DETAILS
Palliative Medicine introduced to spouse and daughter.  Pt is DNR/DNI, comfortable.  Explained to spouse and daughter, palliative will make recommendations for comfort and will remain available for support and symptom management.  Family in agreement with comfort measures only.

## 2019-01-14 NOTE — PROGRESS NOTE ADULT - ASSESSMENT
89 yo with PMHx of CHF, HTN, HLD, CKD  dementia, h/o bladder cancer and radiation on remission, presents s/p mechanical fall. As per history obtained from daughter at bedside, Pt. was trying to get his walker when he fell off balance and fell on his back , but did not hit his head on floor , no LOC. Denies any warning symptoms s/a feeling dizzy or lightheaded/diaphoresis/palpitations/chest pain. States having swelling in left lower extremity and chronic LE swelling, intermittent sob ( on lasix prn)  . As per wife patient has been progressively declining in his functional status lately and has been more confused /disoriented lately .Denies fever/chills/n/v/abd. pain/urinary symptoms/focal weaknesses.  At baseline, he walks with walker, and his daughter/wife helps with daily activities.   In ED trauma work up was negative except for L 7th rib fracture which does not require surgical intervention. (10 Caesar 2019 17:46)    #Mechanical Fall with left 7th rib fracture, H/o recurrent falls  #Acute kidney injury on CKD stage 4, Hyperkalemia, Hypernatremia  #Acute on chronic diastolic CHF with mod Mitral regurg, mod Pulmonary regurg, mild AS with B/l pleural effussions  #NSTEMI  #Pulmonary nodule  # progressively worsening Dementia  #H/o HTN  #H/o Dyslipidemia  #H/o Bladder cancer    Goals of care discussed with again  Family today  - they want him to be DNR/ DNI, on comfort care, no further blood draws  Pt placed on comfort measures. Palliative team consulted

## 2019-01-14 NOTE — PROGRESS NOTE ADULT - SUBJECTIVE AND OBJECTIVE BOX
Patient is a 89y old  Male who presents with a chief complaint of Mechanical fall/weakness (13 Jan 2019 17:45)    Patient was seen and examined.  Pt unresponsive with shallow brathing  Family at bedside.    PAST MEDICAL & SURGICAL HISTORY:  Chronic kidney disease, unspecified CKD stage  Bladder cancer  HTN (hypertension)  Bladder polyps    Allergies  No Known Allergies    MEDICATIONS  (STANDING):  aspirin  chewable 81 milliGRAM(s) Oral daily  atorvastatin 40 milliGRAM(s) Oral at bedtime  finasteride 5 milliGRAM(s) Oral daily  metoprolol tartrate 25 milliGRAM(s) Oral two times a day    MEDICATIONS  (PRN):  acetaminophen   Tablet .. 650 milliGRAM(s) Oral every 4 hours PRN Moderate Pain (4 - 6)  LORazepam   Injectable 0.5 milliGRAM(s) IV Push every 6 hours PRN Agitation  morphine  - Injectable 2 milliGRAM(s) IV Push every 4 hours PRN Severe Pain (7 - 10)    Vital Signs Last 24 Hrs  T(C): 37  T(F): 98.6  HR: 82  BP: 141/64  BP(mean): 92  RR: 20  SpO2: --  O/E:  Unresponsive,  not in distress.  HEENT: atraumatic,  Chest: decreased breath sounds B/l  CVS: SIS2 +, no murmur.  P/A: Soft, BS+  CNS:unresponsive  Ext: no edema feet.  Skin: no rash, no ulcers.                          12.0<L>  8.20  )-----------( 184      ( 13 Jan 2019 06:37 )             37.1<L>                        12.2<L>  7.87  )-----------( 214      ( 12 Jan 2019 12:39 )             37.3<L>    01-13    148<H>  |  108  |  85<HH>  ----------------------------<  125<H>  5.6<H>   |  21  |  4.2<HH>  01-12    147<H>  |  107  |  82<HH>  ----------------------------<  122<H>  4.9   |  25  |  3.8<H>    Ca    10.0      13 Jan 2019 06:37  Ca    10.2<H>      12 Jan 2019 12:39  Mg     2.5     01-13

## 2019-01-14 NOTE — GOALS OF CARE CONVERSATION - PERSONAL ADVANCE DIRECTIVE - TREATMENT GUIDELINES
No blood draws/No IV fluids/DNR Order/Comfort measures only/No artificial nutrition/No antibiotics
do not intubate/DNR Order
Comfort measures only/DNR Order/No artificial nutrition
DNR Order/No blood draws

## 2019-01-14 NOTE — CONSULT NOTE ADULT - ASSESSMENT
89yMale being evaluated for comfort care.    Resp distress well managed w morphine 2mg IV.  Agitation is refractory to ativan 0.5 IV given    Recommendations:  change morphine: 2mg IV q4h ATC    morphine 2mg iv Q2 H prn resp distress  increase ativan : 1mg IV q6h PRN agitation  DNR/DNI/comfort measures only w end of life care      Pt will  in hospital. Family aware, support provided  We will follow  [phone]

## 2019-01-14 NOTE — GOALS OF CARE CONVERSATION - PERSONAL ADVANCE DIRECTIVE - IF HCP IS NOT ON CHART, IDENTIFY THE PERSONS NAME AND PHONE NUMBER CONTACTED TO BRING IN DOCUMENT
FELICIANO SLADE/[id number]
FELICIANO SLADE/[id number]
FELICIANO SLADE/[id number]
FELICIANO SLADE/[id number]

## 2019-01-14 NOTE — GOALS OF CARE CONVERSATION - PERSONAL ADVANCE DIRECTIVE - CONVERSATION/DISCUSSION
Prognosis/Diagnosis
TINOST Discussed/Prognosis
Treatment Options/Diagnosis
Hospice Referral/Diagnosis/Prognosis/MOLST Discussed/Treatment Options

## 2019-01-14 NOTE — GOALS OF CARE CONVERSATION - PERSONAL ADVANCE DIRECTIVE - TREATMENT GUIDELINE COMMENT
DNR/DNI, comfort measures only, no escalation of care, No labs, no artificial nutrition, no antibiotics, no IV fluids. Palliative will remain available to provide ongoing support/symptom management. x [phone] 24/7
Resume oral and IV medications; no blood draws.

## 2019-05-21 NOTE — PROGRESS NOTE ADULT - SUBJECTIVE AND OBJECTIVE BOX
LEO SLADE 89y Male  MRN#: [id number]   CODE STATUS:_DNR/DNI_______      SUBJECTIVE  Patient is a 89y old Male who presents with a chief complaint of Mechanical fall/weakness (10 Caesar 2019 19:25)  Currently admitted to medicine with as a Trauma Code  Hospital course has been complicated by tropinemia.   Today is hospital day 1d, and this morning he is _________ and reports ________ overnight events.         OBJECTIVE  PAST MEDICAL & SURGICAL HISTORY  Chronic kidney disease, unspecified CKD stage  Bladder cancer  HTN (hypertension)  Bladder polyps    ALLERGIES:  No Known Allergies    MEDICATIONS:  STANDING MEDICATIONS  aspirin enteric coated 81 milliGRAM(s) Oral daily  atorvastatin 40 milliGRAM(s) Oral at bedtime  finasteride 5 milliGRAM(s) Oral daily  furosemide   Injectable 40 milliGRAM(s) IV Push two times a day  heparin  Injectable 5000 Unit(s) SubCutaneous every 8 hours  metoprolol tartrate 12.5 milliGRAM(s) Oral two times a day  sodium bicarbonate 650 milliGRAM(s) Oral daily    PRN MEDICATIONS  acetaminophen   Tablet .. 650 milliGRAM(s) Oral every 4 hours PRN      VITAL SIGNS: Last 24 Hours  T(C): 36.5 (10 Caesar 2019 19:00), Max: 36.5 (10 Caesar 2019 19:00)  T(F): 97.7 (10 Caesar 2019 19:00), Max: 97.7 (10 Caesar 2019 19:00)  HR: 104 (10 Caesar 2019 19:00) (68 - 104)  BP: 179/81 (10 Caesar 2019 19:00) (165/73 - 179/81)  BP(mean): 117 (10 Caesar 2019 19:00) (117 - 117)  RR: 18 (10 Caesar 2019 19:00) (18 - 20)  SpO2: 94% (10 Caesar 2019 16:06) (94% - 96%)    LABS:                        11.4   7.12  )-----------( 190      ( 10 Caesar 2019 10:49 )             35.3     01-10    143  |  102  |  81<HH>  ----------------------------<  93  5.5<H>   |  18  |  3.7<H>    Ca    10.2<H>      10 Caesar 2019 21:26    TPro  6.9  /  Alb  4.0  /  TBili  0.9  /  DBili  x   /  AST  24  /  ALT  19  /  AlkPhos  99  01-10      Urinalysis Basic - ( 10 Caesar 2019 10:30 )    Color: Yellow / Appearance: Clear / S.015 / pH: x  Gluc: x / Ketone: Negative  / Bili: Negative / Urobili: 1.0 mg/dL   Blood: x / Protein: 100 mg/dL / Nitrite: Negative   Leuk Esterase: Negative / RBC: 26-50 /HPF / WBC x   Sq Epi: x / Non Sq Epi: Occasional /HPF / Bacteria: x        Creatine Kinase, Serum: 163 U/L (01-10-19 @ 21:26)  Troponin T, Serum: 0.11 ng/mL <HH> (01-10-19 @ 21:26)  Troponin T, Serum: 0.09 ng/mL <HH> (01-10-19 @ 10:49)      CARDIAC MARKERS ( 10 Caesar 2019 21:26 )  x     / 0.11 ng/mL / 163 U/L / x     / 3.5 ng/mL  CARDIAC MARKERS ( 10 Caesar 2019 10:49 )  x     / 0.09 ng/mL / x     / x     / x          RADIOLOGY:  < from: CT Head No Cont (01.10.19 @ 12:20) >    1.  Slightly limited study due to patient motion and motion artifact.     2.  Extensive periventricular and subcortical white matter chronic small   vessel ischemic changes and old left cerebellar infarct.    3.  No acute fractures, mass effect, midline shift or hemorrhage.      < end of copied text >  < from: CT Chest w/ IV Cont (01.10.19 @ 12:39) >  Impression    Nondisplaced left lateral seventh rib fracture.    New moderate to large bilateral pleural effusions with associated   opacities.    New left lower lobe pulmonary nodules measuring up to 12 mm. Follow-up   chest CT is recommended when the patient's symptoms subside    < end of copied text >  < from: CT Cervical Spine No Cont (01.10.19 @ 12:36) >    1.  Degenerative changes of the cervical spine C2-3 through C7-T1 with   multilevel disc osteophyte complexes, bilateral uncinate spurring and   facet osteoarthritic changes as described above.    2.  Straightening of normal cervical lordosis with mild retrolisthesis of   C3 slightly posterior on C4, anterolisthesis of C3-C4 anterior on C5 and   C5 anterior on C6.    3.  Anterior syndesmophytes C3-4 through C7-T1 compatible with DISH.    4.  No acute fractures or dislocations.    < end of copied text >      PHYSICAL EXAM:    GENERAL: NAD, well-developed, AAOx3  HEENT:  Atraumatic, Normocephalic. EOMI, PERRLA, conjunctiva and sclera clear, No JVD  PULMONARY: Clear to auscultation bilaterally; No wheeze  CARDIOVASCULAR: Regular rate and rhythm; No murmurs, rubs, or gallops  GASTROINTESTINAL: Soft, Nontender, Nondistended; Bowel sounds present  MUSCULOSKELETAL:  2+ Peripheral Pulses, No clubbing, cyanosis, or edema  NEUROLOGY: non-focal  SKIN: No rashes or lesions      ADMISSION SUMMARY  Patient is a 89y old Male who presents with a chief complaint of Mechanical fall/weakness (10 Caesar 2019 19:25)  Currently admitted to medicine with the primary diagnosis of MARILYN (acute kidney injury)  89 yo with PMHx of CHF, HTN, HLD, CKD  dementia, h/o bladder cancer and radiation on remission, presents s/p mechanical fall. As per history obtained from daughter at bedside, Pt. was trying to get his walker when he fell off balance and fell on his back, no head trauma, no LOC. Denies any warning symptoms - denies feeling dizzy or lightheaded/diaphoresis/palpitations/chest pain. States having swelling in left lower extremity and chronic LE swelling, intermittent sob ( on lasix prn)  . As per wife patient has been progressively declining in his functional status lately and has been more confused/disoriented lately .Denies fever/chills/n/v/abd. pain/urinary symptoms/focal weaknesses.  At baseline, he walks with walker, and his daughter/wife helps with daily activities.    In ED trauma work up was negative except for L 7th rib fracture which does not require surgical intervention. Family discussed and made pt DNR/DNI for overall declining status.       ASSESSMENT & PLAN  89 yo with PMHx of CHF, HTN, HLD, CKD  dementia, h/o bladder cancer and radiation on remission, presents s/p mechanical fall. In ED trauma work up was negative except for L 7th rib fracture which does not require surgical intervention.  Family is deciding for hospice evaluation and wants to keep patient comfortable with only medical management without any aggressive measures.    #Mechanical Fall with Left 7th Rib fracture  - Trauma w/u reviewed: negative except for 7th rib #; no further intervention  - Pain management  - incentive spirometry    #MARILYN on CKD likely 2/2 pre-renal vs CHF  - trial of IV lasix  - monitor BMP  - if Cr. worsens will consider renal consult and further investigations if family wants    #R/o Decomensated HF // Bilat Pleural Effusions // Troponemia/T wave inversion  - family headed towards home hospice, refused ischemic work-up  - elevated pro BNP >70k (CHF vs CKD)  - hold losartan, c/w asa/lipitor/BB  - Cardio Consult appreciated - only medical mx  - f/u Echo    #Pulmonary Nodule  - Family does not want any intervention (no thoracentesis), no workup at this time as per fam    #HTN  - hold losartan/torsemide  - iv lasix for now    #MISC  - DVT PPX:s/c heparin  - DISPO:from home  - DIET:dash diet  - Activity:bed rest for now  - code status: DNR/DNI. GOC discussed with family at bedside. Family does not want any aggressive measures and is heading towards only comfort care with home hospice if possible.   - Palliative/hospice consult placed  - Overall Poor prognosis    ( ) Discussion with patient and/or family regarding goals of care  ( ) Discussed Case and Plan with Medical Attending, Name:      # Planned Disposition: ________ LEO SLADE 89y Male  MRN#: [id number]   CODE STATUS:_DNR/DNI_______      SUBJECTIVE  Patient is a 89y old Male who presents with a chief complaint of Mechanical fall/weakness (10 Caesar 2019 19:25)  Currently admitted to medicine with as a Trauma Code  Hospital course has been complicated by tropinemia.   Today is hospital day 1d, and this morning he is AAO x0. Mostly calm in no distress, but can get slightly agitated and pulling on escudero.  Long discussion with family at bedside (daughter x3, wife) and agreed to DNR/DNI, CMO.    No further invasive measures - patient comfort only.      OBJECTIVE  PAST MEDICAL & SURGICAL HISTORY  Chronic kidney disease, unspecified CKD stage  Bladder cancer  HTN (hypertension)  Bladder polyps    ALLERGIES:  No Known Allergies    MEDICATIONS:  STANDING MEDICATIONS  aspirin enteric coated 81 milliGRAM(s) Oral daily  atorvastatin 40 milliGRAM(s) Oral at bedtime  finasteride 5 milliGRAM(s) Oral daily  furosemide   Injectable 40 milliGRAM(s) IV Push two times a day  heparin  Injectable 5000 Unit(s) SubCutaneous every 8 hours  metoprolol tartrate 12.5 milliGRAM(s) Oral two times a day  sodium bicarbonate 650 milliGRAM(s) Oral daily    PRN MEDICATIONS  acetaminophen   Tablet .. 650 milliGRAM(s) Oral every 4 hours PRN      VITAL SIGNS: Last 24 Hours  T(C): 36.5 (10 Caesar 2019 19:00), Max: 36.5 (10 Caesar 2019 19:00)  T(F): 97.7 (10 Caesar 2019 19:00), Max: 97.7 (10 Caesar 2019 19:00)  HR: 104 (10 Caesar 2019 19:00) (68 - 104)  BP: 179/81 (10 Caesar 2019 19:00) (165/73 - 179/81)  BP(mean): 117 (10 Caesar 2019 19:00) (117 - 117)  RR: 18 (10 Caesar 2019 19:00) (18 - 20)  SpO2: 94% (10 Caesar 2019 16:06) (94% - 96%)    LABS:                        11.4   7.12  )-----------( 190      ( 10 Caesar 2019 10:49 )             35.3     01-10    143  |  102  |  81<HH>  ----------------------------<  93  5.5<H>   |  18  |  3.7<H>    Ca    10.2<H>      10 Caesar 2019 21:26    TPro  6.9  /  Alb  4.0  /  TBili  0.9  /  DBili  x   /  AST  24  /  ALT  19  /  AlkPhos  99  01-10      Urinalysis Basic - ( 10 Caesar 2019 10:30 )    Color: Yellow / Appearance: Clear / S.015 / pH: x  Gluc: x / Ketone: Negative  / Bili: Negative / Urobili: 1.0 mg/dL   Blood: x / Protein: 100 mg/dL / Nitrite: Negative   Leuk Esterase: Negative / RBC: 26-50 /HPF / WBC x   Sq Epi: x / Non Sq Epi: Occasional /HPF / Bacteria: x        Creatine Kinase, Serum: 163 U/L (01-10-19 @ 21:26)  Troponin T, Serum: 0.11 ng/mL <HH> (01-10-19 @ 21:26)  Troponin T, Serum: 0.09 ng/mL <HH> (01-10-19 @ 10:49)      CARDIAC MARKERS ( 10 Caesar 2019 21:26 )  x     / 0.11 ng/mL / 163 U/L / x     / 3.5 ng/mL  CARDIAC MARKERS ( 10 Ceasar 2019 10:49 )  x     / 0.09 ng/mL / x     / x     / x          RADIOLOGY:  < from: CT Head No Cont (01.10.19 @ 12:20) >    1.  Slightly limited study due to patient motion and motion artifact.     2.  Extensive periventricular and subcortical white matter chronic small   vessel ischemic changes and old left cerebellar infarct.    3.  No acute fractures, mass effect, midline shift or hemorrhage.      < end of copied text >  < from: CT Chest w/ IV Cont (01.10.19 @ 12:39) >  Impression    Nondisplaced left lateral seventh rib fracture.    New moderate to large bilateral pleural effusions with associated   opacities.    New left lower lobe pulmonary nodules measuring up to 12 mm. Follow-up   chest CT is recommended when the patient's symptoms subside    < end of copied text >  < from: CT Cervical Spine No Cont (01.10.19 @ 12:36) >    1.  Degenerative changes of the cervical spine C2-3 through C7-T1 with   multilevel disc osteophyte complexes, bilateral uncinate spurring and   facet osteoarthritic changes as described above.    2.  Straightening of normal cervical lordosis with mild retrolisthesis of   C3 slightly posterior on C4, anterolisthesis of C3-C4 anterior on C5 and   C5 anterior on C6.    3.  Anterior syndesmophytes C3-4 through C7-T1 compatible with DISH.    4.  No acute fractures or dislocations.    < end of copied text >      PHYSICAL EXAM:    GENERAL: NAD, well-developed male , AAOx0, no meaningful responses, babbling in Romansh but as per family not making sense, not following commands  HEENT:  Atraumatic, Normocephalic  PULMONARY: Clear to auscultation bilaterally; No wheeze  CARDIOVASCULAR: Regular rate and rhythm  GASTROINTESTINAL: Soft, Nontender, Nondistended; Bowel sounds present  MUSCULOSKELETAL:  Mild 1+ peripheral edema   NEUROLOGY: unable to assess 2/2 pt status  SKIN: No rashes noted      ADMISSION SUMMARY  Patient is a 89y old Male who presents with a chief complaint of Mechanical fall/weakness (10 Caesar 2019 19:25)  Currently admitted to medicine with the primary diagnosis of MARILYN (acute kidney injury)  89 yo with PMHx of CHF, HTN, HLD, CKD  dementia, h/o bladder cancer and radiation on remission, presents s/p mechanical fall. As per history obtained from daughter at bedside, Pt. was trying to get his walker when he fell off balance and fell on his back, no head trauma, no LOC. Denies any warning symptoms - denies feeling dizzy or lightheaded/diaphoresis/palpitations/chest pain. States having swelling in left lower extremity and chronic LE swelling, intermittent sob ( on lasix prn)  . As per wife patient has been progressively declining in his functional status lately and has been more confused/disoriented lately .Denies fever/chills/n/v/abd. pain/urinary symptoms/focal weaknesses.  At baseline, he walks with walker, and his daughter/wife helps with daily activities.    In ED trauma work up was negative except for L 7th rib fracture which does not require surgical intervention. Family discussed and made pt DNR/DNI for overall declining status.       ASSESSMENT & PLAN  89 yo with PMHx of CHF, HTN, HLD, CKD  dementia, h/o bladder cancer and radiation on remission, presents s/p mechanical fall. In ED trauma work up was negative except for L 7th rib fracture which does not require surgical intervention.  Family is deciding for hospice evaluation and wants to keep patient comfortable with only medical management without any aggressive measures.    #Mechanical Fall with Left 7th Rib fracture  - Trauma w/u reviewed: negative except for 7th rib #; no further intervention  - c/w Pain management  - CMO     #MARILYN on CKD likely 2/2 pre-renal vs CHF  - continue with IV lasix for pt comfort   - NO FURTHER BLOOD DRAWS, no more invasive measures  - hold Echo and duplex     #R/o Decomensated HF // Bilat Pleural Effusions // Troponemia/T wave inversion  - family headed towards home hospice, refused ischemic work-up  - hold losartan, c/w asa/lipitor/BB  - Cardio Consult appreciated - only medical mx  - holding echo, no further work-up   - c/w escudero and IV lasix for pt comfort until hospice in place    #Pulmonary Nodule  - Family does not want any intervention (no thoracentesis), no workup at this time as per fam    #HTN  - IV lasix for pt comfort only     #MISC  - DVT PPX:s/c heparin  - DISPO:from home  - DIET:dash diet  - Activity:bed rest for now  - code status: DNR/DNI. CMO  - Overall Poor prognosis    ( ) Discussion with patient and/or family regarding goals of care  ( ) Discussed Case and Plan with Medical Attending, Name:      # Planned Disposition: ________ Calm/Appropriate

## 2019-11-11 NOTE — DISCHARGE NOTE FOR THE EXPIRED PATIENT - HOSPITAL COURSE
TYLENOL 120 MG SUPPOSITORY GIVEN RECTALLY AS ORDERED. 89 yo with PMHx of CHF, HTN, HLD, CKD  dementia, h/o bladder cancer and radiation on remission, presents s/p mechanical fall. As per history obtained from daughter at bedside, Pt. was trying to get his walker when he fell off balance and fell on his back , but did not hit his head on floor , no LOC. Denies any warning symptoms s/a feeling dizzy or lightheaded/diaphoresis/palpitations/chest pain. States having swelling in left lower extremity and chronic LE swelling, intermittent sob ( on lasix prn)  . As per wife patient has been progressively declining in his functional status lately and has been more confused /disoriented lately .Denies fever/chills/n/v/abd. pain/urinary symptoms/focal weaknesses.  At baseline, he walks with walker, and his daughter/wife helps with daily activities.   In ED trauma work up was negative except for L 7th rib fracture which does not require surgical intervention.  The patient was given a escudero and IV Lasix for comfort care. Aspirin, beta blocker and Lipitor were continued. Blood draws were continued per the family's wishes. No intervention for a pulmonary nodule found was pursued. The patient was given morphine for pain. He  at 18:05 on 19. 89 yo with PMHx of CHF, HTN, HLD, CKD  dementia, h/o bladder cancer and radiation on remission, presents s/p mechanical fall. As per history obtained from daughter at bedside, Pt. was trying to get his walker when he fell off balance and fell on his back , but did not hit his head on floor , no LOC. Denies any warning symptoms s/a feeling dizzy or lightheaded/diaphoresis/palpitations/chest pain. States having swelling in left lower extremity and chronic LE swelling, intermittent sob ( on lasix prn)  . As per wife patient has been progressively declining in his functional status lately and has been more confused /disoriented lately .Denies fever/chills/n/v/abd. pain/urinary symptoms/focal weaknesses.  At baseline, he walks with walker, and his daughter/wife helps with daily activities.   In ED trauma work up was negative except for L 7th rib fracture which does not require surgical intervention.    #Mechanical Fall with left 7th rib fracture, H/o recurrent falls  #Acute kidney injury on CKD stage 4, Hyperkalemia, Hypernatremia  #Acute on chronic diastolic CHF with mod Mitral regurg, mod Pulmonary regurg, mild AS with B/l pleural effussions  #NSTEMI  #Pulmonary nodule  # progressively worsening Dementia  #H/o HTN  #H/o Dyslipidemia  #H/o Bladder cancer  Goals of care discussed with again  Family   - they want him to be DNR/ DNI, on comfort care, no further blood draws    The patient was kept on comfort measures. He  at 18:05 on 19.

## 2020-09-23 NOTE — ED PROVIDER NOTE - OBJECTIVE STATEMENT
SUBJECTIVE:  The patient is a 63-year-old male.  He is here for follow-up on his diabetes.  He is taken metformin 2000 mg daily.  Amaryl 1 mg was added just over a week ago.  Blood sugar today is 127.    PAST MEDICAL HISTORY:  Reviewed.    REVIEW OF SYSTEMS:  Please see above.  All others reviewed and are negative.      OBJECTIVE:   There were no vitals taken for this visit.   Vitals signs are reviewed and are stable.    General:  Well-nourished.  Alert and oriented x3 in no acute distress.  HEENT: PERRLA.   Neck:  Supple.   Lungs:  Clear.    Heart:  Regular rate and rhythm.   Abdomen:   Soft, nontender.   Extremities:  No cyanosis, clubbing or edema.   Neurological:  Grossly intact without motor or sensory deficits.     ASSESSMENT:      Diagnoses and all orders for this visit:    Type 2 diabetes mellitus without complication, with long-term current use of insulin (CMS/[facility])         PLAN: He is approaching his goal.  His blood sugars are reviewed.  He states the medicine is costing him $300 a month.  We will try to get something insurance will cover but in the meantime I have given him samples of Janumet to equal his current dose of Januvia and metformin.  He will continue that and the Amaryl 1 mg.  He will let me know what his blood sugars are running.  He will see me in about 3 months.  He will call if problems.    Dictated utilizing Dragon dictation.    
89 y/o M presents to the ED for suture removal s/p mechanical fall on 3/26/2018. Patient was evaluated in the ED following fall and had two sutures placed along his nasal bridge and an additional five sutures placed below his right eye. Patient denies any cephalgia, nausea, and vomiting.

## 2022-06-14 NOTE — PATIENT PROFILE ADULT - HAS THE PATIENT EXPERIENCED ANY OF THE FOLLOWING WITHIN THE WEEK PRIOR TO ADMISSION?
INR just below goal range of 3-4 at 2.9 (likely from the half dose he took last Tuesday).    Please have him resume his normal dosing of 7.5 mg on Sunday and 5 mg all other days of the week, which will hopefully get his INR back up within range.      He is to call us with a repeat INR in 1 week/next Tuesday.     --jess  
Patient notified of INR recommendations and is in understanding.   
Patients home INR today was 2.9.     Patient had been advised to take 2.5mg on Tuesday and then resume 7.5mg on Sunday and 5mg all other days.     INR prior to that was 4.0.   
fall

## 2022-06-24 NOTE — CONSULT NOTE ADULT - SUBJECTIVE AND OBJECTIVE BOX
89 yo M with severe CHF, HTN, HLD, CKD (baseline Cr 2), dementia, h/o blasdder cancer and radiation in past (5 years ago), friability. Multiple falls from standing while using walker at home during the last week and was more confused especially today. No LOC. Fell today in am and wife saw that he fell backwards from standing while using walker and hit his back, no LOC. Presented by EMS for evaluation. Vitals stable, does not complain on pain but he is slow to respond. Family at bedside.    ALL: NKDA  Meds: ASA 81, metoprolol, torsemide    Physical exam:  Gen: alert, oriented x 1  Lungs: b/l decreased lung sounds at base, no chest wall tenderness  Abd: soft, NT, ND  Neuro: GCS 14  No external signs of trauma                          11.4   7.12  )-----------( 190      ( 10 Caesar 2019 10:49 )             35.3   01-10    144  |  104  |  77<HH>  ----------------------------<  95  5.4<H>   |  20  |  3.8<H>    Ca    10.1      10 Caesar 2019 10:49    TPro  6.9  /  Alb  4.0  /  TBili  0.9  /  DBili  x   /  AST  24  /  ALT  19  /  AlkPhos  99  01-10    BNP 70.000    < from: CT Chest/Abdomen and Pelvis w/ IV Cont (01.10.19 @ 12:40) >  Impression    Nondisplaced left lateral seventh rib fracture.    New moderate to large bilateral pleural effusions with associated   opacities.    New left lower lobe pulmonary nodules measuring up to 12 mm. Follow-up   chest CT is recommended when the patient's symptoms subside    < end of copied text >    < from: CT Cervical Spine No Cont (01.10.19 @ 12:36) >  IMPRESSION:     1.  Degenerative changes of the cervical spine C2-3 through C7-T1 with   multilevel disc osteophyte complexes, bilateral uncinate spurring and   facet osteoarthritic changes as described above.    2.  Straightening of normal cervical lordosis with mild retrolisthesis of   C3 slightly posterior on C4, anterolisthesis of C3-C4 anterior on C5 and   C5 anterior on C6.    3.  Anterior syndesmophytes C3-4 through C7-T1 compatible with DISH.    4.  No acute fractures or dislocations.    < end of copied text >  < from: CT Head No Cont (01.10.19 @ 12:20) >  IMPRESSION:     1.  Slightly limited study due to patient motion and motion artifact.     2.  Extensive periventricular and subcortical white matter chronic small   vessel ischemic changes and old left cerebellar infarct.    3.  No acute fractures, mass effect, midline shift or hemorrhage.      < end of copied text > Bactrim Counseling:  I discussed with the patient the risks of sulfa antibiotics including but not limited to GI upset, allergic reaction, drug rash, diarrhea, dizziness, photosensitivity, and yeast infections.  Rarely, more serious reactions can occur including but not limited to aplastic anemia, agranulocytosis, methemoglobinemia, blood dyscrasias, liver or kidney failure, lung infiltrates or desquamative/blistering drug rashes.

## 2023-06-12 NOTE — PATIENT PROFILE ADULT - FUNCTIONAL SCREEN CURRENT LEVEL: DRESSING, MLM
2 = assistive person Topical Metronidazole Pregnancy And Lactation Text: This medication is Pregnancy Category B and considered safe during pregnancy.  It is also considered safe to use while breastfeeding.
